# Patient Record
Sex: MALE | Race: WHITE | NOT HISPANIC OR LATINO | Employment: OTHER | RURAL
[De-identification: names, ages, dates, MRNs, and addresses within clinical notes are randomized per-mention and may not be internally consistent; named-entity substitution may affect disease eponyms.]

---

## 2021-09-28 ENCOUNTER — OFFICE VISIT (OUTPATIENT)
Dept: PRIMARY CARE CLINIC | Facility: CLINIC | Age: 61
End: 2021-09-28
Payer: COMMERCIAL

## 2021-09-28 VITALS
OXYGEN SATURATION: 97 % | SYSTOLIC BLOOD PRESSURE: 120 MMHG | TEMPERATURE: 98 F | WEIGHT: 203 LBS | HEIGHT: 73 IN | HEART RATE: 97 BPM | BODY MASS INDEX: 26.9 KG/M2 | DIASTOLIC BLOOD PRESSURE: 84 MMHG | RESPIRATION RATE: 20 BRPM

## 2021-09-28 DIAGNOSIS — I25.10 CORONARY ARTERY DISEASE INVOLVING NATIVE CORONARY ARTERY OF NATIVE HEART, ANGINA PRESENCE UNSPECIFIED: ICD-10-CM

## 2021-09-28 DIAGNOSIS — E78.5 HYPERLIPIDEMIA, UNSPECIFIED HYPERLIPIDEMIA TYPE: ICD-10-CM

## 2021-09-28 DIAGNOSIS — F32.A DEPRESSION, UNSPECIFIED DEPRESSION TYPE: ICD-10-CM

## 2021-09-28 DIAGNOSIS — M19.90 ARTHRITIS: ICD-10-CM

## 2021-09-28 DIAGNOSIS — I42.0 DILATED CARDIOMYOPATHY: ICD-10-CM

## 2021-09-28 DIAGNOSIS — J32.9 SINUSITIS, UNSPECIFIED CHRONICITY, UNSPECIFIED LOCATION: ICD-10-CM

## 2021-09-28 DIAGNOSIS — H61.21 IMPACTED CERUMEN OF RIGHT EAR: Primary | ICD-10-CM

## 2021-09-28 LAB
ALBUMIN SERPL BCP-MCNC: 3.8 G/DL (ref 3.5–5)
ALBUMIN/GLOB SERPL: 0.9 {RATIO}
ALP SERPL-CCNC: 90 U/L (ref 45–115)
ALT SERPL W P-5'-P-CCNC: 33 U/L (ref 16–61)
ANION GAP SERPL CALCULATED.3IONS-SCNC: 6 MMOL/L (ref 7–16)
AST SERPL W P-5'-P-CCNC: 18 U/L (ref 15–37)
BILIRUB SERPL-MCNC: 0.4 MG/DL (ref 0–1.2)
BUN SERPL-MCNC: 17 MG/DL (ref 7–18)
BUN/CREAT SERPL: 18 (ref 6–20)
CALCIUM SERPL-MCNC: 9.9 MG/DL (ref 8.5–10.1)
CHLORIDE SERPL-SCNC: 107 MMOL/L (ref 98–107)
CHOLEST SERPL-MCNC: 240 MG/DL (ref 0–200)
CHOLEST/HDLC SERPL: 4.9 {RATIO}
CO2 SERPL-SCNC: 30 MMOL/L (ref 21–32)
CREAT SERPL-MCNC: 0.95 MG/DL (ref 0.7–1.3)
GLOBULIN SER-MCNC: 4.4 G/DL (ref 2–4)
GLUCOSE SERPL-MCNC: 146 MG/DL (ref 74–106)
HDLC SERPL-MCNC: 49 MG/DL (ref 40–60)
LDLC SERPL CALC-MCNC: 139 MG/DL
LDLC/HDLC SERPL: 2.8 {RATIO}
NONHDLC SERPL-MCNC: 191 MG/DL
POTASSIUM SERPL-SCNC: 3.8 MMOL/L (ref 3.5–5.1)
PROT SERPL-MCNC: 8.2 G/DL (ref 6.4–8.2)
SODIUM SERPL-SCNC: 139 MMOL/L (ref 136–145)
TRIGL SERPL-MCNC: 258 MG/DL (ref 35–150)
VLDLC SERPL-MCNC: 52 MG/DL

## 2021-09-28 PROCEDURE — 69209 REMOVE IMPACTED EAR WAX UNI: CPT | Mod: RT,,, | Performed by: FAMILY MEDICINE

## 2021-09-28 PROCEDURE — 99213 PR OFFICE/OUTPT VISIT, EST, LEVL III, 20-29 MIN: ICD-10-PCS | Mod: 25,,, | Performed by: FAMILY MEDICINE

## 2021-09-28 PROCEDURE — 96372 PR INJECTION,THERAP/PROPH/DIAG2ST, IM OR SUBCUT: ICD-10-PCS | Mod: ,,, | Performed by: FAMILY MEDICINE

## 2021-09-28 PROCEDURE — 69209 EAR CERUMEN REMOVAL: ICD-10-PCS | Mod: RT,,, | Performed by: FAMILY MEDICINE

## 2021-09-28 PROCEDURE — 85025 CBC WITH DIFFERENTIAL: ICD-10-PCS | Mod: ,,, | Performed by: CLINICAL MEDICAL LABORATORY

## 2021-09-28 PROCEDURE — 80061 LIPID PANEL: CPT | Mod: ,,, | Performed by: CLINICAL MEDICAL LABORATORY

## 2021-09-28 PROCEDURE — 99213 OFFICE O/P EST LOW 20 MIN: CPT | Mod: 25,,, | Performed by: FAMILY MEDICINE

## 2021-09-28 PROCEDURE — 85025 COMPLETE CBC W/AUTO DIFF WBC: CPT | Mod: ,,, | Performed by: CLINICAL MEDICAL LABORATORY

## 2021-09-28 PROCEDURE — 80061 LIPID PANEL: ICD-10-PCS | Mod: ,,, | Performed by: CLINICAL MEDICAL LABORATORY

## 2021-09-28 PROCEDURE — 80053 COMPREHEN METABOLIC PANEL: CPT | Mod: ,,, | Performed by: CLINICAL MEDICAL LABORATORY

## 2021-09-28 PROCEDURE — 80053 COMPREHENSIVE METABOLIC PANEL: ICD-10-PCS | Mod: ,,, | Performed by: CLINICAL MEDICAL LABORATORY

## 2021-09-28 PROCEDURE — 96372 THER/PROPH/DIAG INJ SC/IM: CPT | Mod: ,,, | Performed by: FAMILY MEDICINE

## 2021-09-28 RX ORDER — ALBUTEROL SULFATE 90 UG/1
2 AEROSOL, METERED RESPIRATORY (INHALATION) EVERY 6 HOURS PRN
COMMUNITY
End: 2021-09-28 | Stop reason: SDUPTHER

## 2021-09-28 RX ORDER — BETAMETHASONE SODIUM PHOSPHATE AND BETAMETHASONE ACETATE 3; 3 MG/ML; MG/ML
6 INJECTION, SUSPENSION INTRA-ARTICULAR; INTRALESIONAL; INTRAMUSCULAR; SOFT TISSUE
Status: COMPLETED | OUTPATIENT
Start: 2021-09-28 | End: 2021-09-28

## 2021-09-28 RX ORDER — CEFTRIAXONE 1 G/1
1 INJECTION, POWDER, FOR SOLUTION INTRAMUSCULAR; INTRAVENOUS
Status: COMPLETED | OUTPATIENT
Start: 2021-09-28 | End: 2021-09-28

## 2021-09-28 RX ORDER — ALBUTEROL SULFATE 90 UG/1
2 AEROSOL, METERED RESPIRATORY (INHALATION) EVERY 6 HOURS PRN
Qty: 18 G | Refills: 5 | Status: SHIPPED | OUTPATIENT
Start: 2021-09-28 | End: 2021-12-27

## 2021-09-28 RX ORDER — NABUMETONE 750 MG/1
750 TABLET, FILM COATED ORAL 2 TIMES DAILY
COMMUNITY
End: 2021-09-28 | Stop reason: SDUPTHER

## 2021-09-28 RX ORDER — SERTRALINE HYDROCHLORIDE 100 MG/1
100 TABLET, FILM COATED ORAL DAILY
COMMUNITY
Start: 2021-08-04 | End: 2024-01-03 | Stop reason: SDUPTHER

## 2021-09-28 RX ORDER — CEPHALEXIN 500 MG/1
500 CAPSULE ORAL EVERY 8 HOURS
Qty: 30 CAPSULE | Refills: 0 | Status: SHIPPED | OUTPATIENT
Start: 2021-09-28

## 2021-09-28 RX ORDER — ROSUVASTATIN CALCIUM 5 MG/1
TABLET, COATED ORAL
COMMUNITY
Start: 2021-06-28 | End: 2021-09-28 | Stop reason: SDUPTHER

## 2021-09-28 RX ORDER — ACETAMINOPHEN AND CODEINE PHOSPHATE 300; 30 MG/1; MG/1
1 TABLET ORAL
Status: ON HOLD | COMMUNITY
Start: 2021-06-30 | End: 2023-12-15 | Stop reason: HOSPADM

## 2021-09-28 RX ORDER — DULOXETIN HYDROCHLORIDE 30 MG/1
30 CAPSULE, DELAYED RELEASE ORAL DAILY
COMMUNITY
Start: 2021-06-28 | End: 2021-09-28 | Stop reason: SDUPTHER

## 2021-09-28 RX ORDER — ROSUVASTATIN CALCIUM 5 MG/1
5 TABLET, COATED ORAL NIGHTLY
Qty: 30 TABLET | Refills: 5 | Status: SHIPPED | OUTPATIENT
Start: 2021-09-28 | End: 2024-01-03

## 2021-09-28 RX ORDER — DULOXETIN HYDROCHLORIDE 30 MG/1
30 CAPSULE, DELAYED RELEASE ORAL DAILY
Qty: 30 CAPSULE | Refills: 5 | Status: SHIPPED | OUTPATIENT
Start: 2021-09-28 | End: 2022-04-27

## 2021-09-28 RX ORDER — OMEPRAZOLE 20 MG/1
20 CAPSULE, DELAYED RELEASE ORAL DAILY
Qty: 30 CAPSULE | Refills: 5 | Status: SHIPPED | OUTPATIENT
Start: 2021-09-28 | End: 2022-04-27

## 2021-09-28 RX ORDER — NABUMETONE 750 MG/1
750 TABLET, FILM COATED ORAL 2 TIMES DAILY
Qty: 60 TABLET | Refills: 5 | Status: SHIPPED | OUTPATIENT
Start: 2021-09-28 | End: 2021-10-28

## 2021-09-28 RX ORDER — DILTIAZEM HYDROCHLORIDE 120 MG/1
120 TABLET, FILM COATED ORAL DAILY
COMMUNITY
Start: 2021-07-27

## 2021-09-28 RX ORDER — CETIRIZINE HYDROCHLORIDE 10 MG/1
10 TABLET ORAL DAILY
Qty: 30 TABLET | Refills: 2 | Status: SHIPPED | OUTPATIENT
Start: 2021-09-28 | End: 2022-09-28

## 2021-09-28 RX ORDER — OMEPRAZOLE 20 MG/1
CAPSULE, DELAYED RELEASE ORAL
COMMUNITY
Start: 2021-06-28 | End: 2021-09-28 | Stop reason: SDUPTHER

## 2021-09-28 RX ADMIN — CEFTRIAXONE 1 G: 1 INJECTION, POWDER, FOR SOLUTION INTRAMUSCULAR; INTRAVENOUS at 03:09

## 2021-09-28 RX ADMIN — BETAMETHASONE SODIUM PHOSPHATE AND BETAMETHASONE ACETATE 6 MG: 3; 3 INJECTION, SUSPENSION INTRA-ARTICULAR; INTRALESIONAL; INTRAMUSCULAR; SOFT TISSUE at 03:09

## 2021-09-29 ENCOUNTER — TELEPHONE (OUTPATIENT)
Dept: PRIMARY CARE CLINIC | Facility: CLINIC | Age: 61
End: 2021-09-29

## 2021-09-29 LAB
BASOPHILS # BLD AUTO: 0.04 K/UL (ref 0–0.2)
BASOPHILS NFR BLD AUTO: 0.4 % (ref 0–1)
DIFFERENTIAL METHOD BLD: ABNORMAL
EOSINOPHIL # BLD AUTO: 0.39 K/UL (ref 0–0.5)
EOSINOPHIL NFR BLD AUTO: 3.6 % (ref 1–4)
ERYTHROCYTE [DISTWIDTH] IN BLOOD BY AUTOMATED COUNT: 14.3 % (ref 11.5–14.5)
HCT VFR BLD AUTO: 43.3 % (ref 40–54)
HGB BLD-MCNC: 13.9 G/DL (ref 13.5–18)
IMM GRANULOCYTES # BLD AUTO: 0.02 K/UL (ref 0–0.04)
IMM GRANULOCYTES NFR BLD: 0.2 % (ref 0–0.4)
LYMPHOCYTES # BLD AUTO: 1.79 K/UL (ref 1–4.8)
LYMPHOCYTES NFR BLD AUTO: 16.4 % (ref 27–41)
MCH RBC QN AUTO: 28.1 PG (ref 27–31)
MCHC RBC AUTO-ENTMCNC: 32.1 G/DL (ref 32–36)
MCV RBC AUTO: 87.7 FL (ref 80–96)
MONOCYTES # BLD AUTO: 0.71 K/UL (ref 0–0.8)
MONOCYTES NFR BLD AUTO: 6.5 % (ref 2–6)
MPC BLD CALC-MCNC: 10.3 FL (ref 9.4–12.4)
NEUTROPHILS # BLD AUTO: 7.96 K/UL (ref 1.8–7.7)
NEUTROPHILS NFR BLD AUTO: 72.9 % (ref 53–65)
NRBC # BLD AUTO: 0 X10E3/UL
NRBC, AUTO (.00): 0 %
PLATELET # BLD AUTO: 374 K/UL (ref 150–400)
RBC # BLD AUTO: 4.94 M/UL (ref 4.6–6.2)
WBC # BLD AUTO: 10.91 K/UL (ref 4.5–11)

## 2022-03-21 ENCOUNTER — OFFICE VISIT (OUTPATIENT)
Dept: PRIMARY CARE CLINIC | Facility: CLINIC | Age: 62
End: 2022-03-21
Payer: COMMERCIAL

## 2022-03-21 DIAGNOSIS — M25.512 SHOULDER PAIN, LEFT: ICD-10-CM

## 2022-03-21 DIAGNOSIS — M19.90 ARTHRITIS: Primary | ICD-10-CM

## 2022-03-21 DIAGNOSIS — R10.9 ABDOMINAL PAIN: ICD-10-CM

## 2022-03-21 DIAGNOSIS — I25.118 CORONARY ARTERY DISEASE OF NATIVE ARTERY OF NATIVE HEART WITH STABLE ANGINA PECTORIS: ICD-10-CM

## 2022-03-21 DIAGNOSIS — K59.00 CONSTIPATION, UNSPECIFIED CONSTIPATION TYPE: ICD-10-CM

## 2022-03-21 PROCEDURE — 99213 PR OFFICE/OUTPT VISIT, EST, LEVL III, 20-29 MIN: ICD-10-PCS | Mod: 25,,, | Performed by: FAMILY MEDICINE

## 2022-03-21 PROCEDURE — 3008F BODY MASS INDEX DOCD: CPT | Mod: CPTII,,, | Performed by: FAMILY MEDICINE

## 2022-03-21 PROCEDURE — 3080F PR MOST RECENT DIASTOLIC BLOOD PRESSURE >= 90 MM HG: ICD-10-PCS | Mod: CPTII,,, | Performed by: FAMILY MEDICINE

## 2022-03-21 PROCEDURE — 3008F PR BODY MASS INDEX (BMI) DOCUMENTED: ICD-10-PCS | Mod: CPTII,,, | Performed by: FAMILY MEDICINE

## 2022-03-21 PROCEDURE — 3077F PR MOST RECENT SYSTOLIC BLOOD PRESSURE >= 140 MM HG: ICD-10-PCS | Mod: CPTII,,, | Performed by: FAMILY MEDICINE

## 2022-03-21 PROCEDURE — 3080F DIAST BP >= 90 MM HG: CPT | Mod: CPTII,,, | Performed by: FAMILY MEDICINE

## 2022-03-21 PROCEDURE — 3077F SYST BP >= 140 MM HG: CPT | Mod: CPTII,,, | Performed by: FAMILY MEDICINE

## 2022-03-21 PROCEDURE — 96372 PR INJECTION,THERAP/PROPH/DIAG2ST, IM OR SUBCUT: ICD-10-PCS | Mod: ,,, | Performed by: FAMILY MEDICINE

## 2022-03-21 PROCEDURE — 96372 THER/PROPH/DIAG INJ SC/IM: CPT | Mod: ,,, | Performed by: FAMILY MEDICINE

## 2022-03-21 PROCEDURE — 99213 OFFICE O/P EST LOW 20 MIN: CPT | Mod: 25,,, | Performed by: FAMILY MEDICINE

## 2022-03-21 RX ADMIN — METHYLPREDNISOLONE ACETATE 80 MG: 80 INJECTION, SUSPENSION INTRA-ARTICULAR; INTRALESIONAL; INTRAMUSCULAR; SOFT TISSUE at 03:03

## 2022-03-21 RX ADMIN — KETOROLAC TROMETHAMINE 60 MG: 30 INJECTION, SOLUTION INTRAMUSCULAR; INTRAVENOUS at 03:03

## 2022-03-21 RX ADMIN — DEXAMETHASONE SODIUM PHOSPHATE 4 MG: 4 INJECTION, SOLUTION INTRA-ARTICULAR; INTRALESIONAL; INTRAMUSCULAR; INTRAVENOUS; SOFT TISSUE at 03:03

## 2022-03-22 DIAGNOSIS — R10.9 ABDOMINAL PAIN, UNSPECIFIED ABDOMINAL LOCATION: ICD-10-CM

## 2022-03-22 DIAGNOSIS — M25.512 ACUTE PAIN OF LEFT SHOULDER: Primary | ICD-10-CM

## 2022-03-23 VITALS
SYSTOLIC BLOOD PRESSURE: 140 MMHG | HEIGHT: 73 IN | OXYGEN SATURATION: 96 % | HEART RATE: 99 BPM | DIASTOLIC BLOOD PRESSURE: 100 MMHG | BODY MASS INDEX: 27.3 KG/M2 | TEMPERATURE: 98 F | WEIGHT: 206 LBS

## 2022-03-23 PROBLEM — M19.049 LOCALIZED, PRIMARY OSTEOARTHRITIS OF HAND: Status: ACTIVE | Noted: 2022-03-23

## 2022-03-23 PROBLEM — Z95.5 PRESENCE OF CORONARY ANGIOPLASTY IMPLANT AND GRAFT: Status: ACTIVE | Noted: 2022-03-23

## 2022-03-23 PROBLEM — F41.1 GENERALIZED ANXIETY DISORDER: Status: ACTIVE | Noted: 2022-03-23

## 2022-03-23 PROBLEM — G89.29 CHRONIC PAIN: Status: ACTIVE | Noted: 2022-03-23

## 2022-03-23 PROBLEM — J34.0 CELLULITIS OF NOSE, EXTERNAL: Status: ACTIVE | Noted: 2022-03-23

## 2022-03-23 PROBLEM — T50.905A ADVERSE EFFECT OF DRUG: Status: ACTIVE | Noted: 2022-03-23

## 2022-03-23 PROBLEM — Z78.9 STATIN NOT TOLERATED: Status: ACTIVE | Noted: 2022-03-23

## 2022-03-23 PROBLEM — K59.00 CONSTIPATION: Status: ACTIVE | Noted: 2022-03-23

## 2022-03-23 PROBLEM — S06.9XAA TRAUMATIC BRAIN INJURY: Status: ACTIVE | Noted: 2022-03-23

## 2022-03-23 PROBLEM — R06.00 DYSPNEA: Status: ACTIVE | Noted: 2022-03-23

## 2022-03-23 PROBLEM — R09.89 LABILE HYPERTENSION DUE TO CLINICAL ENVIRONMENT: Status: ACTIVE | Noted: 2022-03-23

## 2022-03-23 PROBLEM — I10 ESSENTIAL HYPERTENSION: Status: ACTIVE | Noted: 2022-03-23

## 2022-03-23 PROBLEM — G44.009 CLUSTER HEADACHE SYNDROME: Status: ACTIVE | Noted: 2022-03-23

## 2022-03-23 PROBLEM — M79.7 FIBROMYALGIA: Status: ACTIVE | Noted: 2022-03-23

## 2022-03-23 PROBLEM — I25.118 CORONARY ARTERY DISEASE OF NATIVE ARTERY OF NATIVE HEART WITH STABLE ANGINA PECTORIS: Status: ACTIVE | Noted: 2021-09-28

## 2022-03-23 PROBLEM — M46.46 DISCITIS OF LUMBAR REGION: Status: ACTIVE | Noted: 2022-03-23

## 2022-03-23 RX ORDER — DEXAMETHASONE SODIUM PHOSPHATE 4 MG/ML
4 INJECTION, SOLUTION INTRA-ARTICULAR; INTRALESIONAL; INTRAMUSCULAR; INTRAVENOUS; SOFT TISSUE
Status: COMPLETED | OUTPATIENT
Start: 2022-03-23 | End: 2022-03-21

## 2022-03-23 RX ORDER — KETOROLAC TROMETHAMINE 30 MG/ML
60 INJECTION, SOLUTION INTRAMUSCULAR; INTRAVENOUS
Status: COMPLETED | OUTPATIENT
Start: 2022-03-23 | End: 2022-03-21

## 2022-03-23 RX ORDER — METHYLPREDNISOLONE ACETATE 80 MG/ML
80 INJECTION, SUSPENSION INTRA-ARTICULAR; INTRALESIONAL; INTRAMUSCULAR; SOFT TISSUE
Status: COMPLETED | OUTPATIENT
Start: 2022-03-23 | End: 2022-03-21

## 2022-03-23 NOTE — PROGRESS NOTES
Subjective:       Patient ID: Earnest Gonzalez is a 62 y.o. male.    Chief Complaint: Shoulder Injury (Left shoulder pain due to fall 2 days ago.), Abdominal Pain (Worse on left side.), and Sinus Problem      Pt. With arthritic pain and constipation.    Review of Systems   Constitutional: Negative for fatigue and fever.   HENT: Negative for dental problem.    Eyes: Negative for discharge.   Respiratory: Negative for cough, choking, chest tightness and shortness of breath.    Cardiovascular: Negative for chest pain and leg swelling.   Gastrointestinal: Positive for constipation. Negative for diarrhea, nausea and vomiting.   Genitourinary: Negative for discharge and flank pain.   Musculoskeletal: Positive for arthralgias and back pain. Negative for myalgias.   Allergic/Immunologic: Negative for environmental allergies.   Neurological: Negative for headaches and memory loss.   Psychiatric/Behavioral: Negative for behavioral problems and hallucinations.         Objective:      Physical Exam  Vitals and nursing note reviewed.   Constitutional:       Appearance: Normal appearance. He is normal weight.   HENT:      Head: Normocephalic and atraumatic.      Right Ear: Tympanic membrane normal.      Left Ear: Tympanic membrane normal.      Nose: Nose normal.      Mouth/Throat:      Mouth: Mucous membranes are moist.   Eyes:      Extraocular Movements: Extraocular movements intact.      Conjunctiva/sclera: Conjunctivae normal.      Pupils: Pupils are equal, round, and reactive to light.   Cardiovascular:      Rate and Rhythm: Normal rate and regular rhythm.      Pulses: Normal pulses.   Pulmonary:      Effort: Pulmonary effort is normal.      Breath sounds: Normal breath sounds.   Abdominal:      General: Abdomen is flat. Bowel sounds are normal.      Palpations: Abdomen is soft.   Musculoskeletal:         General: Normal range of motion.      Cervical back: Normal range of motion and neck supple.      Comments: Multiple site  arthritis; bilateral shoulder pain   Skin:     General: Skin is warm and dry.   Neurological:      General: No focal deficit present.      Mental Status: He is alert and oriented to person, place, and time.   Psychiatric:         Mood and Affect: Mood normal.         Assessment:       Abdominal pain  -     X-Ray KUB; Future; Expected date: 03/22/2022    Shoulder pain, left  -     X-Ray Shoulder 2 or More Views Left; Future; Expected date: 03/22/2022

## 2023-01-30 ENCOUNTER — PATIENT OUTREACH (OUTPATIENT)
Dept: PRIMARY CARE CLINIC | Facility: CLINIC | Age: 63
End: 2023-01-30
Payer: COMMERCIAL

## 2023-01-30 LAB — CRC RECOMMENDATION EXT: NORMAL

## 2023-02-02 ENCOUNTER — PATIENT OUTREACH (OUTPATIENT)
Dept: PRIMARY CARE CLINIC | Facility: CLINIC | Age: 63
End: 2023-02-02
Payer: COMMERCIAL

## 2023-02-02 LAB — CRC RECOMMENDATION EXT: NORMAL

## 2023-07-31 ENCOUNTER — HOSPITAL ENCOUNTER (OUTPATIENT)
Dept: RADIOLOGY | Facility: HOSPITAL | Age: 63
Discharge: HOME | End: 2023-07-31
Attending: NURSE PRACTITIONER
Payer: COMMERCIAL

## 2023-07-31 DIAGNOSIS — M25.561 ACUTE PAIN OF RIGHT KNEE: ICD-10-CM

## 2023-07-31 PROCEDURE — 73562 X-RAY EXAM OF KNEE 3: CPT | Mod: TC,RT

## 2023-09-05 ENCOUNTER — HOSPITAL ENCOUNTER (OUTPATIENT)
Dept: RADIOLOGY | Facility: HOSPITAL | Age: 63
Discharge: HOME | End: 2023-09-05
Attending: NURSE PRACTITIONER
Payer: COMMERCIAL

## 2023-09-05 ENCOUNTER — OFFICE VISIT (OUTPATIENT)
Dept: ORTHOPEDICS | Facility: CLINIC | Age: 63
End: 2023-09-05
Payer: COMMERCIAL

## 2023-09-05 DIAGNOSIS — S83.271A COMPLEX TEAR OF LATERAL MENISCUS OF RIGHT KNEE AS CURRENT INJURY, INITIAL ENCOUNTER: Primary | ICD-10-CM

## 2023-09-05 DIAGNOSIS — S83.241A ACUTE MEDIAL MENISCUS TEAR OF RIGHT KNEE, INITIAL ENCOUNTER: ICD-10-CM

## 2023-09-05 DIAGNOSIS — M25.561 RIGHT KNEE PAIN, UNSPECIFIED CHRONICITY: ICD-10-CM

## 2023-09-05 PROCEDURE — 1160F PR REVIEW ALL MEDS BY PRESCRIBER/CLIN PHARMACIST DOCUMENTED: ICD-10-PCS | Mod: ,,, | Performed by: ORTHOPAEDIC SURGERY

## 2023-09-05 PROCEDURE — 20610 DRAIN/INJ JOINT/BURSA W/O US: CPT | Mod: PBBFAC | Performed by: ORTHOPAEDIC SURGERY

## 2023-09-05 PROCEDURE — 99212 OFFICE O/P EST SF 10 MIN: CPT | Mod: PBBFAC,25 | Performed by: ORTHOPAEDIC SURGERY

## 2023-09-05 PROCEDURE — 73721 MRI JNT OF LWR EXTRE W/O DYE: CPT | Mod: TC,RT

## 2023-09-05 PROCEDURE — 4010F PR ACE/ARB THEARPY RXD/TAKEN: ICD-10-PCS | Mod: ,,, | Performed by: ORTHOPAEDIC SURGERY

## 2023-09-05 PROCEDURE — 1159F MED LIST DOCD IN RCRD: CPT | Mod: ,,, | Performed by: ORTHOPAEDIC SURGERY

## 2023-09-05 PROCEDURE — 4010F ACE/ARB THERAPY RXD/TAKEN: CPT | Mod: ,,, | Performed by: ORTHOPAEDIC SURGERY

## 2023-09-05 PROCEDURE — 1160F RVW MEDS BY RX/DR IN RCRD: CPT | Mod: ,,, | Performed by: ORTHOPAEDIC SURGERY

## 2023-09-05 PROCEDURE — 1159F PR MEDICATION LIST DOCUMENTED IN MEDICAL RECORD: ICD-10-PCS | Mod: ,,, | Performed by: ORTHOPAEDIC SURGERY

## 2023-09-05 PROCEDURE — 99999PBSHW PR PBB SHADOW TECHNICAL ONLY FILED TO HB: ICD-10-PCS | Mod: PBBFAC,,,

## 2023-09-05 PROCEDURE — 99204 PR OFFICE/OUTPT VISIT, NEW, LEVL IV, 45-59 MIN: ICD-10-PCS | Mod: S$PBB,25,, | Performed by: ORTHOPAEDIC SURGERY

## 2023-09-05 PROCEDURE — 20610 LARGE JOINT ASPIRATION/INJECTION: R KNEE: ICD-10-PCS | Mod: S$PBB,RT,, | Performed by: ORTHOPAEDIC SURGERY

## 2023-09-05 PROCEDURE — 99999PBSHW PR PBB SHADOW TECHNICAL ONLY FILED TO HB: Mod: PBBFAC,,,

## 2023-09-05 PROCEDURE — 99204 OFFICE O/P NEW MOD 45 MIN: CPT | Mod: S$PBB,25,, | Performed by: ORTHOPAEDIC SURGERY

## 2023-09-05 RX ORDER — NAPROXEN 500 MG/1
500 TABLET ORAL 2 TIMES DAILY WITH MEALS
Qty: 60 TABLET | Refills: 3 | Status: SHIPPED | OUTPATIENT
Start: 2023-09-05

## 2023-09-05 RX ADMIN — BUPIVACAINE HYDROCHLORIDE 3 ML: 5 INJECTION, SOLUTION PERINEURAL at 01:09

## 2023-09-05 RX ADMIN — TRIAMCINOLONE ACETONIDE 40 MG: 400 INJECTION, SUSPENSION INTRA-ARTICULAR; INTRAMUSCULAR at 01:09

## 2023-09-05 NOTE — PROGRESS NOTES
ASSESSMENT:      ICD-10-CM ICD-9-CM   1. Complex tear of lateral meniscus of right knee as current injury, initial encounter  S83.271A 836.1   2. Right knee pain, unspecified chronicity  M25.561 719.46       PLAN:     Findings and treatment options were discussed with the patient regarding the diagnosis.   All questions were answered regarding Earnest Gonzalez 's painful knee.      Natural history and expected course discussed. Questions answered. Educational materials distributed. Rest, ice, compression, and elevation (RICE) therapy. Arthrocentesis. See procedure note.  We discussed treatment options as released his right knee.  He does have meniscus tears present but this should respond favorably to injections and an exercise program.  We did discuss early surgical intervention as well but because he is not having mechanical symptoms such as clicking locking popping catching I think that injection is warranted conservative cares warranted at this point.  See back in the next 4-6 weeks for recheck.    There are no Patient Instructions on file for this visit.    IMAGING:   MRI Knee Without Contrast Right    Result Date: 9/5/2023  EXAMINATION: MRI KNEE WITHOUT CONTRAST RIGHT CLINICAL HISTORY: Other tear of medial meniscus, current injury, right knee, initial encounterKnee pain, chronic, negative xray (Age >= 5y); COMPARISON: Right knee x-ray July 31, 2023 TECHNIQUE: Magnetic resonance imaging of the right knee was performed without the use of intravenous contrast. FINDINGS: Medial meniscus: There is degenerative signal throughout much of the body and posterior horn of the medial meniscus with signal continuation to 2 the femoral and tibial articular surfaces, greatest at the junction of the body and posterior horn, suspicious for tear.  There is some irregularity along the dorsal aspect of the junction of the body and posterior horn of the medial meniscus suspicious for vertical component of tear. Lateral  meniscus: There is degenerative signal of the lateral meniscus with continuation of abnormal signal to the tibial articular surface at the junction of the posterior horn and root suspicious for tear. Anterior cruciate ligament: Moderate grade tearing versus mucoid degeneration of the proximal ACL.  Correlate with anterior drawer. Posterior cruciate ligament: The posterior cruciate ligament is grossly intact. Medial collateral ligament: The medial collateral ligament is grossly intact. Lateral collateral ligament: The lateral collateral ligament is grossly  intact. Popliteus tendon: The popliteus tendon is grossly  intact. Extensor mechanism: The patellar tendon and the visualized portion of the quadriceps tendon are grossly intact. Osseous structures and cartilage: Mild to moderate cartilage loss within the medial and lateral compartments.  Moderate to high-grade cartilage loss involving the inferior aspect of the medial patellar facet articular surface with underlying subchondral cystic change/edema.  Mild tricompartmental marginal osteophyte formation.  Suspect a few subcentimeter loose bodies within the dorsal knee joint along the dorsal aspect of the mid PCL. Joint fluid: Small knee joint effusion.  Baker's cyst present measuring up to 2.3 cm in axial dimension and up to 6 cm in craniocaudad dimension.     Suspicion for medial and lateral meniscal tears as detailed. Moderate grade tearing versus mucoid degeneration of the proximal ACL. Correlate with anterior drawer. Mild to moderate cartilage loss within the medial and lateral compartments. Moderate to high-grade cartilage loss involving the inferior aspect of the medial patellar facet articular surface with underlying subchondral cystic change/edema. Mild tricompartmental marginal osteophyte formation. Suspect a few subcentimeter loose bodies within the dorsal knee joint along the dorsal aspect of the mid PCL. Small knee joint effusion. Baker's cyst present  measuring up to 2.3 cm in axial dimension and up to 6 cm in craniocaudad dimension. Point of Service: [facility] Electronically signed by: Ernesto Francisco Date:    09/05/2023 Time:    12:08         CC: Knee pain    63 y.o. Male who presents as a new patient to me for evaluation of  right knee pain.    Occupation: retired  Pain has been present for several weeks now.  Injury description He states he has had several injuries including twisting it while working in the garden, stepping in a hole and jumping off a boat.   Mechanical symptoms, such as clicking, locking, and popping are not present.    Swelling and effusions  are not present.   The patient does  describe symptoms of knee instability, such as the knee buckling and the knee giving way.   Symptoms are worsened with activity.  Better with rest. Treatment thus far has included rest, activity modifications, and oral medications.    he  has not had formal physical therapy.  he has not had prior injections injections into the knee.   he has had previous advanced imaging such as MRI.     Here today to discuss diagnosis and treatment options.          REVIEW OF SYSTEMS:   Constitution: Negative. Negative for chills, fever and night sweats.    Hematologic/Lymphatic: Negative for bleeding problem. Does not bruise/bleed easily.   Skin: Negative for dry skin, itching and rash.   Musculoskeletal: Negative for falls. Positive for knee pain and muscle weakness.     All other review of symptoms were reviewed and found to be noncontributory.     PAST MEDICAL HISTORY:   Past Medical History:   Diagnosis Date    GERD (gastroesophageal reflux disease)     H/O colonoscopy with polypectomy 01/30/2023    Hyperlipidemia     Hypertension        PAST SURGICAL HISTORY:   History reviewed. No pertinent surgical history.    FAMILY HISTORY:   History reviewed. No pertinent family history.    SOCIAL HISTORY:   Social History     Socioeconomic History    Marital status:     Tobacco Use    Smoking status: Never    Smokeless tobacco: Never   Substance and Sexual Activity    Alcohol use: Not Currently    Sexual activity: Yes       MEDICATIONS:     Current Outpatient Medications:     acetaminophen-codeine 300-30mg (TYLENOL #3) 300-30 mg Tab, Take 1 tablet by mouth every 4 to 6 hours as needed., Disp: , Rfl:     albuterol (VENTOLIN HFA) 90 mcg/actuation inhaler, Inhale 2 puffs into the lungs every 6 (six) hours as needed for Wheezing. Rescue, Disp: 18 g, Rfl: 5    cephALEXin (KEFLEX) 500 MG capsule, Take 1 capsule (500 mg total) by mouth every 8 (eight) hours., Disp: 30 capsule, Rfl: 0    cetirizine (ZYRTEC) 10 MG tablet, Take 1 tablet (10 mg total) by mouth once daily., Disp: 30 tablet, Rfl: 2    diltiaZEM (CARDIZEM) 120 MG tablet, Take 120 mg by mouth once daily., Disp: , Rfl:     DULoxetine (CYMBALTA) 30 MG capsule, TAKE 1 CAPSULE BY MOUTH EVERYDAY, Disp: 30 capsule, Rfl: 5    naproxen (NAPROSYN) 500 MG tablet, Take 1 tablet (500 mg total) by mouth 2 (two) times daily with meals., Disp: 60 tablet, Rfl: 3    omeprazole (PRILOSEC) 20 MG capsule, TAKE 1 CAPSULE BY MOUTH EVERYDAY 30 MINUTES PRIOR TO A MEAL, Disp: 30 capsule, Rfl: 2    rosuvastatin (CRESTOR) 5 MG tablet, Take 1 tablet (5 mg total) by mouth every evening., Disp: 30 tablet, Rfl: 5    sertraline (ZOLOFT) 100 MG tablet, Take 100 mg by mouth once daily., Disp: , Rfl:     ALLERGIES:   Review of patient's allergies indicates:  No Known Allergies     PHYSICAL EXAMINATION:    General    Constitutional: He is oriented to person, place, and time. He appears well-developed and well-nourished.   HENT:   Head: Normocephalic and atraumatic.   Nose: Nose normal.   Eyes: EOM are normal. Pupils are equal, round, and reactive to light.   Cardiovascular:  Normal rate and intact distal pulses.            Pulmonary/Chest: Effort normal. No respiratory distress. He exhibits no tenderness.   Abdominal: Soft. He exhibits no distension. There is no  abdominal tenderness.   Neurological: He is alert and oriented to person, place, and time. He has normal reflexes.   Psychiatric: He has a normal mood and affect. His behavior is normal. Judgment and thought content normal.           Right Knee Exam     Inspection   Swelling: present  Deformity: absent    Tenderness   The patient is tender to palpation of the medial retinaculum and medial joint line.    Crepitus   The patient has crepitus of the medial joint line and medial plica.    Range of Motion   Flexion:  abnormal     Tests   Meniscus   Dread:  Medial - positive   Ligament Examination   Lachman: normal (-1 to 2mm)   MCL - Valgus: normal (0 to 2mm)  LCL - Varus: normal  Dial Test at 30 degrees: normal (< 5 degrees)  Posterolateral Corner: unstable (>15 degrees difference)  Patella   Patellar apprehension: negative  Patellar Grind: positive    Other   Sensation: normal    Comments:  Pain with Thessaly Maneuver    Left Knee Exam   Left knee exam is normal.    Muscle Strength   Right Lower Extremity   Quadriceps:  5/5   Hamstrin/5     Reflexes     Right Side   Achilles:  2+  Quadriceps:  2+    Vascular Exam     Right Pulses  Dorsalis Pedis:      2+  Posterior Tibial:      2+            Orders Placed This Encounter   Procedures    Large Joint Aspiration/Injection: R knee     This order was created via procedure documentation       Procedures

## 2023-09-05 NOTE — PROCEDURES
Large Joint Aspiration/Injection: R knee    Date/Time: 9/5/2023 1:00 PM    Performed by: Son Pike MD  Authorized by: Son Pike MD    Consent Done?:  Yes (Verbal)  Indications:  Pain  Local anesthesia used?: No      Details:  Needle Size:  22 G  Approach:  Superior  Location:  Knee  Site:  R knee  Medications:  3 mL BUPivacaine 0.5 % (5 mg/mL); 40 mg triamcinolone acetonide 40 mg/mL  Patient tolerance:  Patient tolerated the procedure well with no immediate complications

## 2023-09-06 RX ORDER — BUPIVACAINE HYDROCHLORIDE 5 MG/ML
3 INJECTION, SOLUTION PERINEURAL
Status: DISCONTINUED | OUTPATIENT
Start: 2023-09-05 | End: 2023-09-06 | Stop reason: HOSPADM

## 2023-09-06 RX ORDER — TRIAMCINOLONE ACETONIDE 40 MG/ML
40 INJECTION, SUSPENSION INTRA-ARTICULAR; INTRAMUSCULAR
Status: DISCONTINUED | OUTPATIENT
Start: 2023-09-05 | End: 2023-09-06 | Stop reason: HOSPADM

## 2023-11-28 ENCOUNTER — OFFICE VISIT (OUTPATIENT)
Dept: ORTHOPEDICS | Facility: CLINIC | Age: 63
End: 2023-11-28
Payer: COMMERCIAL

## 2023-11-28 DIAGNOSIS — Z01.818 PREPROCEDURAL EXAMINATION: Primary | ICD-10-CM

## 2023-11-28 DIAGNOSIS — S83.271D COMPLEX TEAR OF LATERAL MENISCUS OF RIGHT KNEE AS CURRENT INJURY, SUBSEQUENT ENCOUNTER: Primary | ICD-10-CM

## 2023-11-28 PROCEDURE — 4010F PR ACE/ARB THEARPY RXD/TAKEN: ICD-10-PCS | Mod: ,,, | Performed by: ORTHOPAEDIC SURGERY

## 2023-11-28 PROCEDURE — 99213 OFFICE O/P EST LOW 20 MIN: CPT | Mod: PBBFAC | Performed by: ORTHOPAEDIC SURGERY

## 2023-11-28 PROCEDURE — 99214 OFFICE O/P EST MOD 30 MIN: CPT | Mod: S$PBB,,, | Performed by: ORTHOPAEDIC SURGERY

## 2023-11-28 PROCEDURE — 1159F PR MEDICATION LIST DOCUMENTED IN MEDICAL RECORD: ICD-10-PCS | Mod: ,,, | Performed by: ORTHOPAEDIC SURGERY

## 2023-11-28 PROCEDURE — 99214 PR OFFICE/OUTPT VISIT, EST, LEVL IV, 30-39 MIN: ICD-10-PCS | Mod: S$PBB,,, | Performed by: ORTHOPAEDIC SURGERY

## 2023-11-28 PROCEDURE — 4010F ACE/ARB THERAPY RXD/TAKEN: CPT | Mod: ,,, | Performed by: ORTHOPAEDIC SURGERY

## 2023-11-28 PROCEDURE — 1159F MED LIST DOCD IN RCRD: CPT | Mod: ,,, | Performed by: ORTHOPAEDIC SURGERY

## 2023-11-28 NOTE — H&P (VIEW-ONLY)
CC:  Knee pain    63 y.o. Male returns to clinic for a follow up visit regarding knee pain.       Patient received in his right knee on 9/5. He states that the injection lasted less than 2 weeks.   He is continuing to wear his knee brace daily.    He states that he also has left heel pain that started 2 days ago.          Past Medical History:   Diagnosis Date    GERD (gastroesophageal reflux disease)     H/O colonoscopy with polypectomy 01/30/2023    Hyperlipidemia     Hypertension      No past surgical history on file.      PHYSICAL EXAMINATION:  There were no vitals taken for this visit.  General    Constitutional: He is oriented to person, place, and time. He appears well-developed and well-nourished.   HENT:   Head: Normocephalic and atraumatic.   Nose: Nose normal.   Eyes: EOM are normal. Pupils are equal, round, and reactive to light.   Cardiovascular:  Normal rate and intact distal pulses.            Pulmonary/Chest: Effort normal. No respiratory distress. He exhibits no tenderness.   Abdominal: Soft. He exhibits no distension. There is no abdominal tenderness.   Neurological: He is alert and oriented to person, place, and time. He has normal reflexes.   Psychiatric: He has a normal mood and affect. His behavior is normal. Judgment and thought content normal.           Right Knee Exam     Inspection   Swelling: present  Deformity: absent    Tenderness   The patient is tender to palpation of the medial retinaculum and medial joint line.    Crepitus   The patient has crepitus of the medial joint line and medial plica.    Range of Motion   Flexion:  abnormal     Tests   Meniscus   Dread:  Medial - positive   Ligament Examination   Lachman: normal (-1 to 2mm)   MCL - Valgus: normal (0 to 2mm)  LCL - Varus: normal  Dial Test at 30 degrees: normal (< 5 degrees)  Posterolateral Corner: unstable (>15 degrees difference)  Patella   Patellar apprehension: negative  Patellar Grind: positive    Other   Sensation:  normal    Comments:  Pain with Thessaly Maneuver    Left Knee Exam   Left knee exam is normal.    Muscle Strength   Right Lower Extremity   Quadriceps:  5/5   Hamstrin/5     Reflexes     Right Side   Achilles:  2+  Quadriceps:  2+    Vascular Exam     Right Pulses  Dorsalis Pedis:      2+  Posterior Tibial:      2+            IMAGING:  No results found.   Once again reviewed MRI of his right knee which shows the presence of a medial meniscus tear as well as a lateral meniscus tear mucoid degeneration of his ACL with chondromalacia seen along the inferior pole of the patella  ASSESSMENT:      ICD-10-CM ICD-9-CM   1. Complex tear of lateral meniscus of right knee as current injury, subsequent encounter  S83.271D V58.89       PLAN:     -Findings and treatment options were discussed with the patient  -All questions answered  Natural history and expected course discussed. Questions answered.  Educational materials distributed.  Complaining of instability symptoms in the right knee.  Has failed an injection.  At this point is reasonable consider right knee arthroscopy with medial and lateral meniscectomy and chondroplasty    The conservative options including NSAIDs, activity modification, physical therapy, corticosteroid injection, and viscosupplimentation/ Platelet rich plasma injections were discussed. he is interested in surgical intervention at this time, as conservative measures up to this point have not fully relieved or resolved his symptoms.    The surgical process of knee arthroscopy was discussed in detail with the patient including a detailed discussion of the procedure itself (including visual model, x-ray review, and literature review). The typical perioperative and post-operative course was discussed and perioperative risks were discussed to the patient's satisfaction.  Risks and complications discussed included but were not limited to the risks of anesthetic complications, infection, bleeding, wound  healing complications, instability, limb length inequality, neurologic dysfunction including numbness,  DVT, pulmonary embolism, perioperative medical risks (cardiac, pulmonary, renal, neurologic), and death and the patient elects to proceed. We will initiate pre-operative medical evaluation and set a provisional date for surgical intervention according to the patient's schedule. 25 minutes was spent in direct consultation with the patient counselling him on the items listed above.      There are no Patient Instructions on file for this visit.      No orders of the defined types were placed in this encounter.        Procedures

## 2023-11-29 DIAGNOSIS — S83.271D COMPLEX TEAR OF LATERAL MENISCUS OF RIGHT KNEE AS CURRENT INJURY, SUBSEQUENT ENCOUNTER: Primary | ICD-10-CM

## 2023-11-29 DIAGNOSIS — S83.271A COMPLEX TEAR OF LATERAL MENISCUS OF RIGHT KNEE: ICD-10-CM

## 2023-11-29 RX ORDER — SODIUM CHLORIDE 9 MG/ML
INJECTION, SOLUTION INTRAVENOUS CONTINUOUS
Status: CANCELLED | OUTPATIENT
Start: 2023-11-29

## 2023-11-29 RX ORDER — MUPIROCIN 20 MG/G
OINTMENT TOPICAL
Status: CANCELLED | OUTPATIENT
Start: 2023-11-29

## 2023-11-30 ENCOUNTER — TELEPHONE (OUTPATIENT)
Dept: ORTHOPEDICS | Facility: CLINIC | Age: 63
End: 2023-11-30
Payer: COMMERCIAL

## 2023-11-30 NOTE — TELEPHONE ENCOUNTER
----- Message from Imelda Woodall sent at 11/30/2023  1:46 PM CST -----  Calling stating that he has 2 dates for surgery. 12/11 and 12/13. Can you call and confirm the date. The 13th would be better. Call back # [phone] or [phone]

## 2023-12-01 ENCOUNTER — TELEPHONE (OUTPATIENT)
Dept: ORTHOPEDICS | Facility: CLINIC | Age: 63
End: 2023-12-01
Payer: COMMERCIAL

## 2023-12-01 NOTE — TELEPHONE ENCOUNTER
----- Message from Lala Lundberg sent at 12/1/2023 10:52 AM CST -----  Regarding: SPEAK WITH SURJIT  WOULD LIKE TO SPEAK WITH SURJIT. CALL BACK NUMBER IS [phone] OR [phone].

## 2023-12-04 ENCOUNTER — TELEPHONE (OUTPATIENT)
Dept: ORTHOPEDICS | Facility: CLINIC | Age: 63
End: 2023-12-04
Payer: COMMERCIAL

## 2023-12-04 NOTE — TELEPHONE ENCOUNTER
----- Message from Judy Lou sent at 12/4/2023  1:34 PM CST -----  Regarding: Returning call  Patient wife Beckie is returning your call. Please call her @ [phone] or [phone]

## 2023-12-15 ENCOUNTER — ANESTHESIA (OUTPATIENT)
Dept: SURGERY | Facility: HOSPITAL | Age: 63
End: 2023-12-15
Payer: COMMERCIAL

## 2023-12-15 ENCOUNTER — HOSPITAL ENCOUNTER (OUTPATIENT)
Facility: HOSPITAL | Age: 63
Discharge: HOME | End: 2023-12-15
Attending: ORTHOPAEDIC SURGERY
Payer: COMMERCIAL

## 2023-12-15 ENCOUNTER — ANESTHESIA EVENT (OUTPATIENT)
Dept: SURGERY | Facility: HOSPITAL | Age: 63
End: 2023-12-15
Payer: COMMERCIAL

## 2023-12-15 VITALS
DIASTOLIC BLOOD PRESSURE: 71 MMHG | SYSTOLIC BLOOD PRESSURE: 103 MMHG | HEART RATE: 62 BPM | BODY MASS INDEX: 27.18 KG/M2 | TEMPERATURE: 98 F | RESPIRATION RATE: 15 BRPM | OXYGEN SATURATION: 95 % | WEIGHT: 206 LBS

## 2023-12-15 DIAGNOSIS — I10 HYPERTENSION: ICD-10-CM

## 2023-12-15 DIAGNOSIS — S83.271A COMPLEX TEAR OF LATERAL MENISCUS OF RIGHT KNEE: Primary | ICD-10-CM

## 2023-12-15 DIAGNOSIS — S83.271D COMPLEX TEAR OF LATERAL MENISCUS OF RIGHT KNEE AS CURRENT INJURY, SUBSEQUENT ENCOUNTER: ICD-10-CM

## 2023-12-15 PROCEDURE — 37000008 HC ANESTHESIA 1ST 15 MINUTES: Performed by: ORTHOPAEDIC SURGERY

## 2023-12-15 PROCEDURE — 63600175 PHARM REV CODE 636 W HCPCS: Performed by: NURSE ANESTHETIST, CERTIFIED REGISTERED

## 2023-12-15 PROCEDURE — 25000003 PHARM REV CODE 250: Performed by: NURSE ANESTHETIST, CERTIFIED REGISTERED

## 2023-12-15 PROCEDURE — 29879 ARTHRS KNE SRG ABRASJ ARTHRP: CPT | Mod: RT,,, | Performed by: ORTHOPAEDIC SURGERY

## 2023-12-15 PROCEDURE — 63600175 PHARM REV CODE 636 W HCPCS: Performed by: ORTHOPAEDIC SURGERY

## 2023-12-15 PROCEDURE — D9220A PRA ANESTHESIA: Mod: CRNA,,, | Performed by: NURSE ANESTHETIST, CERTIFIED REGISTERED

## 2023-12-15 PROCEDURE — 29880 PR KNEE SCOPE MED/LAT MENISCECTOMY: ICD-10-PCS | Mod: 51,RT,, | Performed by: ORTHOPAEDIC SURGERY

## 2023-12-15 PROCEDURE — D9220A PRA ANESTHESIA: ICD-10-PCS | Mod: ANES,,, | Performed by: ANESTHESIOLOGY

## 2023-12-15 PROCEDURE — 25000003 PHARM REV CODE 250: Performed by: ORTHOPAEDIC SURGERY

## 2023-12-15 PROCEDURE — 36000710: Performed by: ORTHOPAEDIC SURGERY

## 2023-12-15 PROCEDURE — 27201423 OPTIME MED/SURG SUP & DEVICES STERILE SUPPLY: Performed by: ORTHOPAEDIC SURGERY

## 2023-12-15 PROCEDURE — 71000033 HC RECOVERY, INTIAL HOUR: Performed by: ORTHOPAEDIC SURGERY

## 2023-12-15 PROCEDURE — 37000009 HC ANESTHESIA EA ADD 15 MINS: Performed by: ORTHOPAEDIC SURGERY

## 2023-12-15 PROCEDURE — 71000016 HC POSTOP RECOV ADDL HR: Performed by: ORTHOPAEDIC SURGERY

## 2023-12-15 PROCEDURE — 27000510 HC BLANKET BAIR HUGGER ANY SIZE: Performed by: NURSE ANESTHETIST, CERTIFIED REGISTERED

## 2023-12-15 PROCEDURE — 27000716 HC OXISENSOR PROBE, ANY SIZE: Performed by: NURSE ANESTHETIST, CERTIFIED REGISTERED

## 2023-12-15 PROCEDURE — 29880 ARTHRS KNE SRG MNISECTMY M&L: CPT | Mod: 51,RT,, | Performed by: ORTHOPAEDIC SURGERY

## 2023-12-15 PROCEDURE — D9220A PRA ANESTHESIA: ICD-10-PCS | Mod: CRNA,,, | Performed by: NURSE ANESTHETIST, CERTIFIED REGISTERED

## 2023-12-15 PROCEDURE — 97161 PT EVAL LOW COMPLEX 20 MIN: CPT

## 2023-12-15 PROCEDURE — 36000711: Performed by: ORTHOPAEDIC SURGERY

## 2023-12-15 PROCEDURE — 29879 PR KNEE SCOPE,ABRASN ARTHROPLASTY: ICD-10-PCS | Mod: RT,,, | Performed by: ORTHOPAEDIC SURGERY

## 2023-12-15 PROCEDURE — 71000015 HC POSTOP RECOV 1ST HR: Performed by: ORTHOPAEDIC SURGERY

## 2023-12-15 PROCEDURE — D9220A PRA ANESTHESIA: Mod: ANES,,, | Performed by: ANESTHESIOLOGY

## 2023-12-15 PROCEDURE — 27000177 HC AIRWAY, LARYNGEAL MASK: Performed by: NURSE ANESTHETIST, CERTIFIED REGISTERED

## 2023-12-15 PROCEDURE — 63600175 PHARM REV CODE 636 W HCPCS

## 2023-12-15 RX ORDER — ONDANSETRON 4 MG/1
8 TABLET, ORALLY DISINTEGRATING ORAL EVERY 8 HOURS PRN
Status: DISCONTINUED | OUTPATIENT
Start: 2023-12-15 | End: 2023-12-15 | Stop reason: HOSPADM

## 2023-12-15 RX ORDER — FENTANYL CITRATE 50 UG/ML
INJECTION, SOLUTION INTRAMUSCULAR; INTRAVENOUS
Status: DISCONTINUED | OUTPATIENT
Start: 2023-12-15 | End: 2023-12-15

## 2023-12-15 RX ORDER — BUPIVACAINE HYDROCHLORIDE 2.5 MG/ML
INJECTION, SOLUTION EPIDURAL; INFILTRATION; INTRACAUDAL
Status: DISCONTINUED | OUTPATIENT
Start: 2023-12-15 | End: 2023-12-15 | Stop reason: HOSPADM

## 2023-12-15 RX ORDER — ETOMIDATE 2 MG/ML
INJECTION INTRAVENOUS
Status: DISCONTINUED | OUTPATIENT
Start: 2023-12-15 | End: 2023-12-15

## 2023-12-15 RX ORDER — SODIUM CHLORIDE 9 MG/ML
INJECTION, SOLUTION INTRAVENOUS CONTINUOUS
Status: DISCONTINUED | OUTPATIENT
Start: 2023-12-15 | End: 2023-12-15 | Stop reason: HOSPADM

## 2023-12-15 RX ORDER — CEFAZOLIN SODIUM 1 G/3ML
INJECTION, POWDER, FOR SOLUTION INTRAMUSCULAR; INTRAVENOUS
Status: DISCONTINUED | OUTPATIENT
Start: 2023-12-15 | End: 2023-12-15

## 2023-12-15 RX ORDER — OXYCODONE HYDROCHLORIDE 5 MG/1
10 TABLET ORAL EVERY 4 HOURS PRN
Status: DISCONTINUED | OUTPATIENT
Start: 2023-12-15 | End: 2023-12-15 | Stop reason: HOSPADM

## 2023-12-15 RX ORDER — MEPERIDINE HYDROCHLORIDE 25 MG/ML
25 INJECTION INTRAMUSCULAR; INTRAVENOUS; SUBCUTANEOUS EVERY 10 MIN PRN
Status: DISCONTINUED | OUTPATIENT
Start: 2023-12-15 | End: 2023-12-15 | Stop reason: HOSPADM

## 2023-12-15 RX ORDER — MUPIROCIN 20 MG/G
OINTMENT TOPICAL
Status: DISCONTINUED | OUTPATIENT
Start: 2023-12-15 | End: 2023-12-15 | Stop reason: HOSPADM

## 2023-12-15 RX ORDER — OXYCODONE AND ACETAMINOPHEN 5; 325 MG/1; MG/1
1 TABLET ORAL EVERY 6 HOURS PRN
Qty: 30 TABLET | Refills: 0 | Status: SHIPPED | OUTPATIENT
Start: 2023-12-15

## 2023-12-15 RX ORDER — PHENYLEPHRINE HYDROCHLORIDE 10 MG/ML
INJECTION INTRAVENOUS
Status: DISCONTINUED | OUTPATIENT
Start: 2023-12-15 | End: 2023-12-15

## 2023-12-15 RX ORDER — ONDANSETRON 2 MG/ML
4 INJECTION INTRAMUSCULAR; INTRAVENOUS DAILY PRN
Status: DISCONTINUED | OUTPATIENT
Start: 2023-12-15 | End: 2023-12-15 | Stop reason: HOSPADM

## 2023-12-15 RX ORDER — MORPHINE SULFATE 10 MG/ML
4 INJECTION INTRAMUSCULAR; INTRAVENOUS; SUBCUTANEOUS EVERY 5 MIN PRN
Status: DISCONTINUED | OUTPATIENT
Start: 2023-12-15 | End: 2023-12-15 | Stop reason: HOSPADM

## 2023-12-15 RX ORDER — TRIAMCINOLONE ACETONIDE 40 MG/ML
INJECTION, SUSPENSION INTRA-ARTICULAR; INTRAMUSCULAR
Status: DISCONTINUED | OUTPATIENT
Start: 2023-12-15 | End: 2023-12-15 | Stop reason: HOSPADM

## 2023-12-15 RX ORDER — HYDROMORPHONE HYDROCHLORIDE 2 MG/ML
0.5 INJECTION, SOLUTION INTRAMUSCULAR; INTRAVENOUS; SUBCUTANEOUS EVERY 5 MIN PRN
Status: DISCONTINUED | OUTPATIENT
Start: 2023-12-15 | End: 2023-12-15 | Stop reason: HOSPADM

## 2023-12-15 RX ORDER — OXYCODONE AND ACETAMINOPHEN 5; 325 MG/1; MG/1
1 TABLET ORAL EVERY 4 HOURS PRN
Status: DISCONTINUED | OUTPATIENT
Start: 2023-12-15 | End: 2023-12-15 | Stop reason: HOSPADM

## 2023-12-15 RX ORDER — ONDANSETRON 2 MG/ML
INJECTION INTRAMUSCULAR; INTRAVENOUS
Status: DISCONTINUED | OUTPATIENT
Start: 2023-12-15 | End: 2023-12-15

## 2023-12-15 RX ORDER — LIDOCAINE HYDROCHLORIDE 20 MG/ML
INJECTION, SOLUTION EPIDURAL; INFILTRATION; INTRACAUDAL; PERINEURAL
Status: DISCONTINUED | OUTPATIENT
Start: 2023-12-15 | End: 2023-12-15

## 2023-12-15 RX ORDER — ONDANSETRON 4 MG/1
4 TABLET, ORALLY DISINTEGRATING ORAL EVERY 6 HOURS PRN
Qty: 30 TABLET | Refills: 0 | Status: SHIPPED | OUTPATIENT
Start: 2023-12-15

## 2023-12-15 RX ORDER — MIDAZOLAM HYDROCHLORIDE 1 MG/ML
INJECTION INTRAMUSCULAR; INTRAVENOUS
Status: DISCONTINUED | OUTPATIENT
Start: 2023-12-15 | End: 2023-12-15

## 2023-12-15 RX ORDER — EPINEPHRINE 1 MG/ML
INJECTION, SOLUTION, CONCENTRATE INTRAVENOUS
Status: DISCONTINUED | OUTPATIENT
Start: 2023-12-15 | End: 2023-12-15 | Stop reason: HOSPADM

## 2023-12-15 RX ORDER — DIPHENHYDRAMINE HYDROCHLORIDE 50 MG/ML
25 INJECTION INTRAMUSCULAR; INTRAVENOUS EVERY 6 HOURS PRN
Status: DISCONTINUED | OUTPATIENT
Start: 2023-12-15 | End: 2023-12-15 | Stop reason: HOSPADM

## 2023-12-15 RX ORDER — IPRATROPIUM BROMIDE AND ALBUTEROL SULFATE 2.5; .5 MG/3ML; MG/3ML
3 SOLUTION RESPIRATORY (INHALATION) ONCE
Status: DISCONTINUED | OUTPATIENT
Start: 2023-12-15 | End: 2023-12-15 | Stop reason: HOSPADM

## 2023-12-15 RX ADMIN — CEFAZOLIN 2 G: 1 INJECTION, POWDER, FOR SOLUTION INTRAMUSCULAR; INTRAVENOUS; PARENTERAL at 01:12

## 2023-12-15 RX ADMIN — LIDOCAINE HYDROCHLORIDE 100 MG: 20 INJECTION, SOLUTION INTRAVENOUS at 01:12

## 2023-12-15 RX ADMIN — FENTANYL CITRATE 100 MCG: 50 INJECTION INTRAMUSCULAR; INTRAVENOUS at 01:12

## 2023-12-15 RX ADMIN — MIDAZOLAM HYDROCHLORIDE 2 MG: 1 INJECTION, SOLUTION INTRAMUSCULAR; INTRAVENOUS at 01:12

## 2023-12-15 RX ADMIN — ETOMIDATE 12 MG: 2 INJECTION, SOLUTION INTRAVENOUS at 01:12

## 2023-12-15 RX ADMIN — PHENYLEPHRINE HYDROCHLORIDE 300 MCG: 10 INJECTION INTRAVENOUS at 02:12

## 2023-12-15 RX ADMIN — PHENYLEPHRINE HYDROCHLORIDE 200 MCG: 10 INJECTION INTRAVENOUS at 01:12

## 2023-12-15 RX ADMIN — ONDANSETRON 4 MG: 2 INJECTION INTRAMUSCULAR; INTRAVENOUS at 01:12

## 2023-12-15 RX ADMIN — SODIUM CHLORIDE: 9 INJECTION, SOLUTION INTRAVENOUS at 01:12

## 2023-12-15 NOTE — PLAN OF CARE
Problem: Physical Therapy  Goal: Physical Therapy Goal  Description: STG:  Pt will be independent in home exercise program   Pt will be independent in gait using crutches with NWB: right lower extremity     LTG:  Pt will return home to prior level of function with all mobility   Outcome: Ongoing, Progressing

## 2023-12-15 NOTE — PT/OT/SLP EVAL
Physical Therapy Evaluation and Discharge Note    Patient Name:  Earnest Gonzalez   MRN:  [id number]    Recommendations:     Discharge Recommendations: Low Intensity Therapy  Discharge Equipment Recommendations: crutches, axillary   Barriers to discharge: None    Assessment:     Earnest Gonzalez is a 63 y.o. male admitted with a medical diagnosis of Complex tear of lateral meniscus of right knee. Pt was educated on home exercise program and use of crutches. He was able to negotiate steps with assistance. Pt understands NWB status but may have difficulty adhering to this .  At this time, patient is functioning at their prior level of function and does not require further acute PT services.     Recent Surgery: Procedure(s) (LRB):  ARTHROSCOPY, KNEE, WITH CHONDROPLASTY (Right)  ARTHROSCOPY, KNEE, WITH MENISCECTOMY (Right)  REMOVAL, LOOSE BODY, JOINT, ARTHROSCOPIC (Right) Day of Surgery    Plan:     During this hospitalization, patient does not require further acute PT services.  Please re-consult if situation changes.      Subjective     Chief Complaint: right knee scope  Patient/Family Comments/goals: Pt is agreeable to PT   Pain/Comfort:  Pain Rating 1: 0/10  Pain Rating Post-Intervention 1: 0/10    Patients cultural, spiritual, Zoroastrian conflicts given the current situation: no    Living Environment:  Pt lives at home with wife  Prior to admission, patients level of function was independent.  Equipment used at home: none.  DME owned (not currently used): none.  Upon discharge, patient will have assistance from family.    Objective:     Communicated with RN prior to session.  Patient found HOB elevated with peripheral IV upon PT entry to room.    General Precautions: Standard, fall    Orthopedic Precautions:RLE non weight bearing   Braces: N/A  Respiratory Status: Room air    Exams:  Cognitive Exam:  Patient is oriented to Person, Place, Time, and Situation  Gross Motor Coordination:  WFL    Functional Mobility:  Bed  Mobility:     Scooting: supervision  Supine to Sit: supervision  Transfers:     Sit to Stand:  contact guard assistance with axillary crutches  Gait: 80 ft contact guard assistance, TTWB with crutches  Balance: good  Stairs:  Pt ascended/descended 3 stair(s) with Axillary crutches with no handrails with Contact Guard Assistance.     AM-PAC 6 CLICK MOBILITY  Total Score:23       Treatment and Education:  Educated on crutches and home exercise program     AM-PAC 6 CLICK MOBILITY  Total Score:23     Patient left sitting edge of bed with all lines intact and call button in reach.    GOALS:   Multidisciplinary Problems       Physical Therapy Goals          Problem: Physical Therapy    Goal Priority Disciplines Outcome Goal Variances Interventions   Physical Therapy Goal     PT, PT/OT Ongoing, Progressing     Description: STG:  Pt will be independent in home exercise program   Pt will be independent in gait using crutches with NWB: right lower extremity     LTG:  Pt will return home to prior level of function with all mobility                        History:     Past Medical History:   Diagnosis Date    GERD (gastroesophageal reflux disease)     H/O colonoscopy with polypectomy 01/30/2023    Hyperlipidemia     Hypertension        History reviewed. No pertinent surgical history.    Time Tracking:     PT Received On: 12/15/23  PT Start Time: 1510     PT Stop Time: 1526  PT Total Time (min): 16 min     Billable Minutes: Evaluation low complexity      12/15/2023

## 2023-12-15 NOTE — INTERVAL H&P NOTE
The patient has been examined and the H&P has been reviewed:    I concur with the findings and no changes have occurred since H&P was written.    Anesthesia/Surgery risks, benefits and alternative options discussed and understood by patient/family.      Will also proceed with left plantar fascia injection    There are no hospital problems to display for this patient.

## 2023-12-15 NOTE — ANESTHESIA PREPROCEDURE EVALUATION
12/15/2023  Earnest Gonzalez is a 63 y.o., male.      Pre-op Assessment    I have reviewed the Patient Summary Reports.     I have reviewed the Nursing Notes. I have reviewed the NPO Status.   I have reviewed the Medications.     Review of Systems  Anesthesia Hx:             Denies Family Hx of Anesthesia complications.    Denies Personal Hx of Anesthesia complications.                    Social:  Non-Smoker, No Alcohol Use       Hematology/Oncology:  Hematology Normal   Oncology Normal                                   EENT/Dental:  EENT/Dental Normal           Cardiovascular:     Hypertension   Denies MI. CAD  asymptomatic                                      Renal/:  Renal/ Normal                 Hepatic/GI:     GERD             Musculoskeletal:  Musculoskeletal Normal                Neurological:  Neurology Normal   Denies CVA.                                    Endocrine:  Endocrine Normal            Dermatological:  Skin Normal    Psych:    depression                Physical Exam  General: Well nourished, Cooperative, Alert and Oriented    Airway:  Mallampati: II / II  Mouth Opening: Normal  TM Distance: Normal  Neck ROM: Normal ROM    Dental:  Intact    Chest/Lungs:  Clear to auscultation    Heart:  Rate: Normal  Rhythm: Regular Rhythm  Sounds: Normal        Chemistry        Component Value Date/Time     11/28/2023 1303    K 3.7 11/28/2023 1303     (H) 11/28/2023 1303    CO2 28 11/28/2023 1303    BUN 21 (H) 11/28/2023 1303    CREATININE 1.10 11/28/2023 1303     (H) 11/28/2023 1303        Component Value Date/Time    CALCIUM 9.2 11/28/2023 1303    ALKPHOS 90 09/28/2021 1509    AST 18 09/28/2021 1509    ALT 33 09/28/2021 1509    BILITOT 0.4 09/28/2021 1509    EGFRNONAA 86 09/28/2021 1509        Lab Results   Component Value Date    WBC 10.91 09/28/2021    HGB 13.9 09/28/2021    HCT  43.3 09/28/2021     09/28/2021     No results found for this or any previous visit.      Anesthesia Plan  Type of Anesthesia, risks & benefits discussed:    Anesthesia Type: Gen Supraglottic Airway  Intra-op Monitoring Plan: Standard ASA Monitors  Post Op Pain Control Plan: multimodal analgesia  Induction:  IV  Airway Plan: Direct  Informed Consent: Informed consent signed with the Patient and all parties understand the risks and agree with anesthesia plan.  All questions answered.   ASA Score: 3  Day of Surgery Review of History & Physical: H&P Update referred to the surgeon/provider.I have interviewed and examined the patient. I have reviewed the patient's H&P dated: There are no significant changes.     Ready For Surgery From Anesthesia Perspective.     .

## 2023-12-15 NOTE — OR NURSING
1415-Pt rec'd to RR sedated, unresponsive to stimulation, on 6L O2 via simple fm, NADN, SaO2-88% and increasing, oral airway in, resp even et unlabored, IV fluids infusing, dressing to right leg is C/D/I, cap refill < 3 secs, toes are warm to touch, + dorsalis pedis pulse, will con't to monitor, safety measures in effect.    1430-Pt drowsy, responds to stimulation, placed on room air, oral airway removed, Sao2-97%, no c/o pain, able to wiggle toes, denies any c/o numbness/tingling, will con't to monitor.    1445-Pt slightly drowsy, no c/o pain, NADN, orders to discharge to ASC room 23.    1450-Pt care released to Rosalee(RN), pt awake with wife at bedside, vs hr-61, resp-15, b/p 100/70,  SaO2-95%.

## 2023-12-15 NOTE — ANESTHESIA PROCEDURE NOTES
Intubation    Date/Time: 12/15/2023 1:31 PM    Performed by: Familia Stone CRNA  Authorized by: Ras Dacosta MD    Intubation:     Induction:  Intravenous    Intubated:  Postinduction    Mask Ventilation:  Easy mask    Attempts:  1    Attempted By:  CRNA    Difficult Airway Encountered?: No      Complications:  None    Airway Device:  Supraglottic airway/LMA    Airway Device Size:  5.0    Style/Cuff Inflation:  Cuffed (inflated to minimal occlusive pressure)    Placement Verified By:  Capnometry    Complicating Factors:  None    Findings Post-Intubation:  BS equal bilateral

## 2023-12-15 NOTE — PLAN OF CARE
Menisectomy and Debridement                                                                                            Post-Operative Protocol    Small microfracture was performed recommend nonweightbearing completely for 2 weeks followed by partial weight-bearing for 2 weeks followed by full weight-bearing 4 weeks  Phase I - Maximum Protection (Week 0 to 1):    0 to 1 week:  Ice and modalities as needed to reduce pain and inflammation  Use crutches for 2 to 5 days to help reduce swelling, the patient may discontinue crutches when able to walk without a limp or pain  Elevate the knee above the heart for the first three to five days  Initiate patella mobility drills  Begin full active/passive knee range of motion exercises  Quadriceps setting focusing on VMO function  Multi-plane open kinetic chain straight leg raising  Gait training  Begin stationary bike as swelling and pain allow    Phase II - Progressive Stretching and Early Strengthening Phase (Weeks 1 to 4):    Weeks 1 to 4:  Maintain program from week 0 to 1  Continue modalities as needed  Initiate lower extremity stretching  Begin treadmill and/or elliptical  as strength and swelling allow, avoid impact activities  Begin bilateral closed kinetic chain strengthening progressing to unilateral as tolerated  Promote normal patellofemoral arthrokinematics  Implement reintegration exercises emphasizing core stability exercises  Begin closed kinetic chain multi-plane hip exercises  Manual lower extremity PNF patterns  Proprioception drills emphasizing neuromuscular control    Phase III - Advanced Strengthening and Proprioception Phase (Weeks 4 to 6):    Weeks 4 to 6:  Modalities as needed  Continue with phase II exercises as indicated  Advance time and intensity on cardiovascular program-no running  Begin functional cord resistance program  Initiate gym strengthening program 3 times per week, including leg press, squats, lunges, knee  extensions (30° to 0° progressing to full range as PF arthtokinematics normalize), hamstring curls, ab/adduction, and calf raises  Begin pool running program          Phase IV - Advanced Strengthening Phase (Weeks 6 to 7):    Weeks 6 to 7:  Implement a full gym-strengthening program  Begin running program    Phase V - Return to Sports Phase (Week 8):    Week 8:  Follow-up examination with the physician  Continue with aggressive lower extremity strengthening, stretching, and cardiovascular training  Implement sport specific multi-directional drills  Initiate plyometric exercises beginning with bilateral progressing to unilateral  Sports test for return to play                         Yes

## 2023-12-15 NOTE — OP NOTE
[facility] - Orthopedic Periop Services  Operative Note    Surgery Date: 12/15/2023      Surgeon(s) and Role:     * Son Pike MD - Primary    Assistant: Sina Nuñez    Pre-op Diagnosis:   Complex tear of lateral meniscus of right knee as current injury, subsequent encounter [S83.271D]     Post-op Diagnosis:  Post-Op Diagnosis Codes:     * Complex tear of lateral meniscus of right knee as current injury, subsequent encounter [S83.271D]   Complex medial meniscus tear right knee  Osteoarthritis of the patellofemoral compartment  Grade 4 chondral defect of lateral femoral condyle  Loose body right knee    Procedure:    Right knee arthroscopic medial and lateral meniscectomies  Right knee arthroscopic abrasion chondroplasty/microfracture of grade 4 lateral femoral condyle defect measuring 1 cm x 1 cm in size  Right knee arthroscopic loose body removal of loose body from the patellofemoral compartment  Right knee arthroscopic chondroplasty of the patellofemoral compartment  Anesthesia:  General    EBL:  5cc    Implants: * No implants in log *    Tourniquet time: 0 mins    Complication:   none        INDICATIONS:  The patient is a 63 y.o. year-old who had a history and physical examination consistent with the aforementioned diagnoses.  The risks and benefits were discussed with the patient.  The patient acknowledged an understanding and wished to proceed with operative intervention.  Informed consent was obtained prior to the procedure.  Details of the surgical procedure were explained including incisions, probable rehabilitation course and description of the hardware and graft to be used was given.  The patient understands the likely length of convalescence after surgery and we reasonably explained the risks, benefits and alternatives to surgery.  The reasonable expectations and potential complications were discussed and acknowledged including, but not limited to, infection, bleeding, blood clots, nerve injury, retear,  instability and continued pain and stiffness.  It was also explained that there was a chance of failure, which may require further management.  The patient agreed, understood and wished to proceed.      Procedure: The patient was taken to the operating room and placed supine.  Anesthesia was administered and pre-operative antibiotics were given.  A timeout was performed.  Patient was positioned appropriately and prepped and draped in the usual sterile fashion.  Standard anteromedial and anterolateral portals were established and a diagnostic arthroscopy was performed; systemic examination of the joint revealed the following:     PF  negative compartmentalization or adhesions in the suprapatellar pouch.   Trochlear chondromalacia:grade III  Patella chondromalacia: grade III    Medial  Medial femoral chondyle chondromalacia : Grade I-II  Medial tibial plateau chondromalacia none.  Positive for meniscus tear.  This was a horizontal cleavage-type tear involving the posterior horn of the medial meniscus.  I used a combination of biters and Yo to debride this back to a stable base as this horizontal cleavage tear extending from the root attachment to the junction with the anterior and posterior horn.  After performing a thorough debridement also performed a chondroplasty of the grade 2 chondromalacia of the medial femoral condyle    Lateral  Lateral femoral condyle chondromalacia: grade III  Lateral tibial plateau chondromalacia: grade I  Positive for meniscus tear.  There was a focal grade 4 chondral defect seen on the lateral femoral condyle weight-bearing surface.  This measured 1 cm x 1 cm in size.  I used a curette to create sharp vertical margins surrounding the periphery of the lesion and remove the calcified chondral layer then utilized a microfracture awl to complete the microfracture spaced about 3-4 mm apart throughout the defect.  I then turned my attention towards the meniscus were displaced flap of  lateral meniscus posterior root attachment had flipped into the notch and this was debrided with a shaver and the tear that extended into the root attachment was not complete but rather represented a partial tear this was debrided with a motorized shaver back to a stable base.  There was also fraying of the meniscus along the peripheral rim I debrided this with a shaver as well to the [employer] stable base.  ACL PCL demonstrated partial-thickness tearing this was likewise debrided attention was then turned towards loose body in the patellofemoral compartment which was removed motorized shaver and a chondroplasty performed over the femur along the aspect of the trochlea along the patella as well                        This completed the procedure all instruments were removed. Portals were closed with 3-0 nylon.  0.25% bupivacaine was injected along the portal sites and sterile dressings were applied.           Disposition:awakened from anesthesia, extubated and taken to the recovery room in a stable condition, having suffered no apparent untoward event.

## 2023-12-15 NOTE — TRANSFER OF CARE
Anesthesia Transfer of Care Note    Patient: Earnest Gonzalez    Procedure(s) Performed: Procedure(s) (LRB):  ARTHROSCOPY, KNEE, WITH CHONDROPLASTY (Right)  ARTHROSCOPY, KNEE, WITH MENISCECTOMY (Right)  REMOVAL, LOOSE BODY, JOINT, ARTHROSCOPIC (Right)    Patient location: PACU    Anesthesia Type: general    Transport from OR: Transported from OR on room air with adequate spontaneous ventilation    Post pain: adequate analgesia    Post assessment: no apparent anesthetic complications    Post vital signs: stable    Level of consciousness: sedated    Nausea/Vomiting: no nausea/vomiting    Complications: none    Transfer of care protocol was followed      Last vitals: Visit Vitals  BP (!) 87/57   Pulse 64   Temp 36.5 °C (97.7 °F) (Oral)   Resp 19   Wt 93.4 kg (206 lb)   SpO2 (!) 88%   BMI 27.18 kg/m²

## 2023-12-18 DIAGNOSIS — Z98.890 S/P RIGHT KNEE ARTHROSCOPY: Primary | ICD-10-CM

## 2023-12-18 NOTE — ANESTHESIA POSTPROCEDURE EVALUATION
Anesthesia Post Evaluation    Patient: Earnest Gonzalez    Procedure(s) Performed: Procedure(s) (LRB):  ARTHROSCOPY, KNEE, WITH CHONDROPLASTY (Right)  ARTHROSCOPY, KNEE, WITH MENISCECTOMY (Right)  REMOVAL, LOOSE BODY, JOINT, ARTHROSCOPIC (Right)    Final Anesthesia Type: general      Patient location during evaluation: PACU  Patient participation: Yes- Able to Participate  Level of consciousness: awake and alert  Post-procedure vital signs: reviewed and stable  Pain management: adequate  Airway patency: patent  ABDIFATAH mitigation strategies: Multimodal analgesia  PONV status at discharge: No PONV  Anesthetic complications: no      Cardiovascular status: blood pressure returned to baseline  Respiratory status: unassisted  Hydration status: euvolemic  Follow-up not needed.              Vitals Value Taken Time   /71 12/15/23 1501   Temp 36.5 °C (97.7 °F) 12/15/23 1420   Pulse 60 12/15/23 1509   Resp 15 12/15/23 1450   SpO2 98 % 12/15/23 1509   Vitals shown include unvalidated device data.      Event Time   Out of Recovery 14:45:00         Pain/Melissa Score: No data recorded

## 2023-12-21 ENCOUNTER — CLINICAL SUPPORT (OUTPATIENT)
Dept: REHABILITATION | Facility: HOSPITAL | Age: 63
End: 2023-12-21
Payer: COMMERCIAL

## 2023-12-21 DIAGNOSIS — R29.898 WEAKNESS OF RIGHT LOWER EXTREMITY: Primary | ICD-10-CM

## 2023-12-21 DIAGNOSIS — Z98.890 S/P RIGHT KNEE ARTHROSCOPY: ICD-10-CM

## 2023-12-21 PROCEDURE — 97110 THERAPEUTIC EXERCISES: CPT

## 2023-12-21 PROCEDURE — 97161 PT EVAL LOW COMPLEX 20 MIN: CPT

## 2023-12-21 PROCEDURE — 97530 THERAPEUTIC ACTIVITIES: CPT

## 2023-12-21 NOTE — PLAN OF CARE
"[facility]   Physical Therapy Initial Evaluation      Name: Earnest Gonzalez  Clinic Number: [id number]    Therapy Diagnosis:   Encounter Diagnoses   Name Primary?    S/P right knee arthroscopy     Weakness of right lower extremity Yes        Physician: Son Pike MD    Physician Orders: PT Eval and Treat   Medical Diagnosis from Referral: s/p R knee arthroscopy   Evaluation Date: 12/21/2023  Authorization Period Expiration: 12/17/2024  Plan of Care Expiration: 01/19/2024  Progress Note Due: 01/19/2024  Date of Surgery: 12/15/2023  Visit # / Visits authorized: 1/8  FOTO: to be completed on visit 4    Precautions: Standard     Time In: 8:01   Time Out: 8:51  Total Billable Time: 50 minutes (42 minutes billable and 8 minutes not billed for cold pack)     Subjective   Date of onset: 12/15/2023     History of current condition - Josias reports: R knee scope on 12/15/2023 for meniscus repair and debridement. He reports that he has had minimal pain since surgery. He reports occasional episodes of sharp knee pain in his R knee with certain movements. He desires to return to hunting as soon as possible.     Falls: None     Imaging: MRI studies, and Xrays prior to surgery    Prior Therapy: None   Social History:  lives with their spouse  Occupation: Retired   Prior Level of Function: Independent   Current Level of Function: Independent     Pain:  Current 0/10, worst 7/10, best 0/10   Location: right knee    Description: Aching and Sharp  Aggravating Factors: Stairs   Easing Factors: rest    Patients goals: "I am ready to get back to hunting."      Medical History:   Past Medical History:   Diagnosis Date    GERD (gastroesophageal reflux disease)     H/O colonoscopy with polypectomy 01/30/2023    Hyperlipidemia     Hypertension        Surgical History:   Earnest Gonzalez  has a past surgical history that includes Arthroscopic chondroplasty of knee joint (Right, 12/15/2023); Knee arthroscopy w/ " meniscectomy (Right, 12/15/2023); and Arthroscopic removal of loose body from joint (Right, 12/15/2023).    Medications:   Earnest has a current medication list which includes the following prescription(s): albuterol, cephalexin, cetirizine, diltiazem, duloxetine, naproxen, omeprazole, ondansetron, oxycodone-acetaminophen, rosuvastatin, and sertraline.    Allergies:   Review of patient's allergies indicates:  No Known Allergies     Objective    Incisions: Patient's inicisions appear to be healing well with no signs of drainage or infection.   Girth Measurements: Right 37 cm at midpatella vs. Left 36.5 cm at midpatella     Range of motion:  Motion Right Left    Hip flexion  WITHIN FUNCTIONAL LIMITS  WITHIN FUNCTIONAL LIMITS   Hip extension  WITHIN FUNCTIONAL LIMITS  WITHIN FUNCTIONAL LIMITS   Hip abduction  WITHIN FUNCTIONAL LIMITS  WITHIN FUNCTIONAL LIMITS   Hip adduction  WITHIN FUNCTIONAL LIMITS  WITHIN FUNCTIONAL LIMITS   Knee extension   0 degrees   with 0 degree quad lag   WITHIN FUNCTIONAL LIMITS   Knee flexion   125 degrees   WITHIN FUNCTIONAL LIMITS   Ankle DF  WITHIN FUNCTIONAL LIMITS  WITHIN FUNCTIONAL LIMITS   Ankle PF  WITHIN FUNCTIONAL LIMITS  WITHIN FUNCTIONAL LIMITS   Ankle Inversion  WITHIN FUNCTIONAL LIMITS  WITHIN FUNCTIONAL LIMITS   Ankle Eversion  WITHIN FUNCTIONAL LIMITS  WITHIN FUNCTIONAL LIMITS     Manual muscle test   Muscle Right  Left    Hip flexion  MMT strength: 4-/5  MMT strength: 5/5   Hip extension  MMT strength: 4-/5  MMT strength: 5/5   Hip abduction  MMT strength: 4-/5  MMT strength: 5/5   Hip adduction  MMT strength: 4-/5  MMT strength: 5/5   Knee extension  MMT strength: 3+/5  MMT strength: 5/5   Knee flexion  MMT strength: 3+/5  MMT strength: 5/5   Ankle DF  MMT strength: 4/5  MMT strength: 5/5   Ankle PF  MMT strength: 4/5  MMT strength: 5/5   Ankle inversion  MMT strength: 4/5  MMT strength: 5/5   Ankle eversion  MMT strength: 4/5  MMT strength: 5/5     Gait:  Patient ambulated  "into clinic full weight bearing on R LE with no assistive device although his post operative protocol states that he should be non-weight bearing for two weeks. Patient reports that he was using crutches up until yesterday.     Patient ambulates with decreased R heelstrike and decreased R knee extension during stance.     Clinical Special Tests:  N/A post operative       Intake Outcome Measure for FOTO Survey    Therapist reviewed FOTO scores for Earnest Gonzalez on 12/21/2023.   FOTO report - see Media section or FOTO account episode details.    Intake Score: 61%       Treatment   Total Treatment time (time-based codes) separate from Evaluation: 38  minutes  (30 minutes billed and 8 minutes not billed for ice)     Josias received the treatments listed below:      therapeutic exercises to develop strength, endurance, ROM, and flexibility for 20 minutes including:  See flowsheet below     therapeutic activities to improve functional performance for 10  minutes, including: see flowsheet below  Ther- Ex Reps    Nustep 6 minutes    Wedge 1 minute    Hamstring Stretch 2 x 20"    Heelslides 20 x    LAQs  2 x 10 3"    Hamstring Curls 2 x 10 red TB    Quad sets  2 x 10 3"    4 way hip 10 x each direction    Straight leg raises with ER  10 x            Ther-Act Reps   Heel Raises 2 x 10    Mini Squats 2 x 10    3 way hip    Sit <> stands     Forward Step ups 2 x 10    Lateral Step ups                             cold pack for 10 minutes to  R knee with elevation above heart level to decrease post exercise irritation and edema .    Patient Education and Home Exercises     Education provided:   - eval findings, plan of care, Home exercise program, and post surgical restrictions     Written Home Exercises Provided: yes. Exercises were reviewed and Josias was able to demonstrate them prior to the end of the session.  Josias demonstrated good  understanding of the education provided. See EMR under Patient Instructions for exercises provided " during therapy sessions.    Assessment     Earnest is a 63 y.o. male referred to outpatient Physical Therapy with a medical diagnosis of s/p R knee arthroscope- meniscectomy and debridement. Patient presents with R knee pain, decreased R LE muscle strength and decreased motor control of R LE. Patient's impairments are limiting his overall activity tolerance at this time.     PT provided patient with visual, verbal, and tactile cueing throughout session for proper LE alignment, form, hold times, and increased eccentric control with exercises. PT noted minimally decreased R LE motor control with standing tasks. PT educated on post surgical restrictions. PT progressed patient with standing tasks per MD protocol due to patient not being compliant with non weight bearing at this time. No adverse effects noted to treatment tasks.     Patient prognosis is Good.   Patient will benefit from skilled outpatient Physical Therapy to address the deficits stated above and in the chart below, provide patient /family education, and to maximize patientt's level of independence.     Plan of care discussed with patient: Yes  Patient's spiritual, cultural and educational needs considered and patient is agreeable to the plan of care and goals as stated below:     Anticipated Barriers for therapy: compliance with home exercise program and treatment, compliance with post surgical restrictions    Medical Necessity is demonstrated by the following  History  Co-morbidities and personal factors that may impact the plan of care [x] LOW: no personal factors / co-morbidities  [] MODERATE: 1-2 personal factors / co-morbidities  [] HIGH: 3+ personal factors / co-morbidities    Moderate / High Support Documentation:   Co-morbidities affecting plan of care: NA    Personal Factors:   no deficits     Examination  Body Structures and Functions, activity limitations and participation restrictions that may impact the plan of care [x] LOW: addressing 1-2  elements  [] MODERATE: 3+ elements  [] HIGH: 4+ elements (please support below)    Moderate / High Support Documentation: NA     Clinical Presentation [x] LOW: stable  [] MODERATE: Evolving  [] HIGH: Unstable     Decision Making/ Complexity Score: low       Goals:  Short Term Goals: 2 weeks   Patient will independently complete Home exercise program with correct form.   Patient will report R knee pain at worst of less than or equal to 5/10 for improved quality of life.     Long Term Goals: 4 weeks   Patient will report decreased R knee pain with going up and down stairs to less than or equal to 3/10 for improved quality of life.  Pt will increase R knee strength to 4+/5 to allow patient to safely return to prior level of function   Patient will ambulate community distance with improved gait mechanics.   Patient will have increased FOTO score to greater than or equal to 75% for increased function.     Plan   Plan of care Certification: 12/21/2023 to 01/19/2023.    Outpatient Physical Therapy 2 times weekly for 4 weeks to include the following interventions: Electrical Stimulation unattended, Gait Training, Manual Therapy, Patient Education, Therapeutic Activities, and Therapeutic Exercise.     Alessandro Jessica, PT, DPT         Physician's Signature: _________________________________________ Date: ________________

## 2023-12-22 ENCOUNTER — OFFICE VISIT (OUTPATIENT)
Dept: ORTHOPEDICS | Facility: CLINIC | Age: 63
End: 2023-12-22
Payer: COMMERCIAL

## 2023-12-22 DIAGNOSIS — Z98.890 STATUS POST ARTHROSCOPY OF RIGHT KNEE: Primary | ICD-10-CM

## 2023-12-22 PROCEDURE — 4010F ACE/ARB THERAPY RXD/TAKEN: CPT | Mod: ,,, | Performed by: NURSE PRACTITIONER

## 2023-12-22 PROCEDURE — 99212 OFFICE O/P EST SF 10 MIN: CPT | Mod: PBBFAC | Performed by: NURSE PRACTITIONER

## 2023-12-22 PROCEDURE — 4010F PR ACE/ARB THEARPY RXD/TAKEN: ICD-10-PCS | Mod: ,,, | Performed by: NURSE PRACTITIONER

## 2023-12-22 PROCEDURE — 1159F MED LIST DOCD IN RCRD: CPT | Mod: ,,, | Performed by: NURSE PRACTITIONER

## 2023-12-22 PROCEDURE — 1160F RVW MEDS BY RX/DR IN RCRD: CPT | Mod: ,,, | Performed by: NURSE PRACTITIONER

## 2023-12-22 PROCEDURE — 99024 PR POST-OP FOLLOW-UP VISIT: ICD-10-PCS | Mod: ,,, | Performed by: NURSE PRACTITIONER

## 2023-12-22 PROCEDURE — 1160F PR REVIEW ALL MEDS BY PRESCRIBER/CLIN PHARMACIST DOCUMENTED: ICD-10-PCS | Mod: ,,, | Performed by: NURSE PRACTITIONER

## 2023-12-22 PROCEDURE — 1159F PR MEDICATION LIST DOCUMENTED IN MEDICAL RECORD: ICD-10-PCS | Mod: ,,, | Performed by: NURSE PRACTITIONER

## 2023-12-22 PROCEDURE — 99024 POSTOP FOLLOW-UP VISIT: CPT | Mod: ,,, | Performed by: NURSE PRACTITIONER

## 2023-12-22 NOTE — PATIENT INSTRUCTIONS
Safety guidelines and activity restrictions discussed with the patient.  Continue physical therapy efforts.  Sutures removed Steri-Strips applied.  Return orthopedic clinic in 4 weeks follow-up Dr. Tom Pike or sooner as needed.

## 2023-12-22 NOTE — PROGRESS NOTES
63-year-old male patient presents ambulatory orthopedic clinic follow-up of right knee arthroscopy by Dr. Son Pike on 12/15/2023.  He reports resolution of pain symptoms.  Denies fever, chills or any sinus symptom flexion.  States he was only taking 3 of his pain medications.  Does not require further pain medication.      PE:  Physical exam right knee he is noted be ambulating independently no perceptible limp.  He does have a knee brace in place.  The knee brace is removed.  Skin is warm dry.  Portal sites healing well without sign or symptom flexion.  No soft tissue swelling noted.  No intra-articular effusion appreciated clinically.  Range motion right knee 0° extension with further flexion to approximately 120°.      Impression:  1 week following right knee arthroscopy    Plan:  Safety guidelines and activity restrictions discussed with the patient.  Continue physical therapy efforts.  Sutures removed Steri-Strips applied.  Return orthopedic clinic in 4 weeks follow-up Dr. Tom Pike or sooner as needed.

## 2023-12-26 NOTE — DISCHARGE SUMMARY
[facility] - Orthopedic Periop Services  Discharge Note  Short Stay    Procedure(s) (LRB):  ARTHROSCOPY, KNEE, WITH CHONDROPLASTY (Right)  ARTHROSCOPY, KNEE, WITH MENISCECTOMY (Right)  REMOVAL, LOOSE BODY, JOINT, ARTHROSCOPIC (Right)      OUTCOME: Patient tolerated treatment/procedure well without complication and is now ready for discharge.    DISPOSITION: Home or Self Care    FINAL DIAGNOSIS:  Complex tear of lateral meniscus of right knee    FOLLOWUP: In clinic    DISCHARGE INSTRUCTIONS:    Discharge Procedure Orders   Ambulatory referral/consult to Physical/Occupational Therapy   Standing Status: Future   Referral Priority: Routine Referral Type: Physical Medicine   Referral Reason: Specialty Services Required   Number of Visits Requested: 1     Diet general     Remove dressing in 72 hours   Order Comments: Remove dressing in 3 days.  Place band-aids over portal sites.     Call MD for:  temperature >100.4     Call MD for:  persistent nausea and vomiting     Call MD for:  severe uncontrolled pain     Call MD for:  difficulty breathing, headache or visual disturbances     Call MD for:  redness, tenderness, or signs of infection (pain, swelling, redness, odor or green/yellow discharge around incision site)     Call MD for:  hives     Call MD for:  persistent dizziness or light-headedness     Call MD for:  extreme fatigue     Weight bearing restrictions (specify)   Order Comments: Please refer to op note for therapy plan        TIME SPENT ON DISCHARGE: 9 minutes

## 2023-12-29 ENCOUNTER — CLINICAL SUPPORT (OUTPATIENT)
Dept: REHABILITATION | Facility: HOSPITAL | Age: 63
End: 2023-12-29
Payer: COMMERCIAL

## 2023-12-29 DIAGNOSIS — R29.898 WEAKNESS OF RIGHT LOWER EXTREMITY: Primary | ICD-10-CM

## 2023-12-29 PROCEDURE — 97530 THERAPEUTIC ACTIVITIES: CPT | Mod: CQ

## 2023-12-29 PROCEDURE — 97110 THERAPEUTIC EXERCISES: CPT | Mod: CQ

## 2023-12-29 NOTE — PROGRESS NOTES
"[facility]   Physical Therapy Treatment Note      Name: Earnest Gonzalez  Clinic Number: [id number]    Therapy Diagnosis:        Encounter Diagnoses   Name Primary?    S/P right knee arthroscopy      Weakness of right lower extremity Yes        Physician: Son Pike MD     Physician Orders: PT Eval and Treat   Medical Diagnosis from Referral: s/p R knee arthroscopy   Evaluation Date: 12/21/2023  Authorization Period Expiration: 12/17/2024  Plan of Care Expiration: 01/19/2024  Progress Note Due: 01/19/2024  Date of Surgery: 12/15/2023  Visit # / Visits authorized: 2/8  FOTO: to be completed on visit 4     Precautions: Standard      Time In: 10:26  Time Out: 11:00  Total Billable Time:  34 minutes   Visit Date: 12/29/2023      PTA Visit #: 1/5       Subjective     Patient reports: "I've already walked 5 miles this morning.  I'm tired but I'm not hurting".   .  He was compliant with home exercise program.  Response to previous treatment: No adverse effects noted   Functional change: Early in Plan of Care     Pain: 0/10  Location: right knee      Objective      Objective Measures updated at progress report unless specified.     Treatment     Josias received the treatments listed below:    therapeutic exercises to develop strength, endurance, ROM, and flexibility.  See flow-sheet below:       Ther- Ex Reps    Nustep 10 * minutes    Wedge 2 minutes *    Hamstring Stretch 2 x 20"    Heelslides 20 x    LAQs  2 x 10 3"    Hamstring Curls 2 x 10 red TB    Quad sets  3 x 10, 5" *    4 way hip 10 x each direction    Straight leg raises with ER  10 x                    therapeutic activities to improve static and dynamic functional performance.  See flow-sheet below:   Ther-Act Reps   Heel Raises 3 x 10 *   Mini Squats 2 x 10    3 way hip     Sit <> stands  2 x 10 *   Forward Step ups 2 x 10    Lateral Step ups                                         cold pack for 10 minutes to  R knee with elevation " above heart level to decrease post exercise irritation and edema     Patient Education and Home Exercises       Education provided:   - Plan of Care, Home exercise program,Delayed onset muscle soreness     Written Home Exercises Provided: Patient instructed to cont prior HEP. Exercises were reviewed and Josias was able to demonstrate them prior to the end of the session.  Josias demonstrated good  understanding of the education provided. See Electronic Medical Record under Patient Instructions for exercises provided during therapy sessions    Assessment     Earnest is a 63 y.o. male referred to outpatient Physical Therapy with a medical diagnosis of s/p R knee arthroscope- meniscectomy and debridement. Patient presents with R knee pain, decreased R LE muscle strength and decreased motor control of R LE. Patient's impairments are limiting his overall activity tolerance at this time.  Patient requires min verbal and visual cues to progress through Therapeutic exercises with proper alignment, speed of movement, count, and hold times. Patient requires cues x 3 to complete 4 way hip without compensations.  No adverse effects noted.     Josias Is progressing well towards his goals.   Patient prognosis is Good.     Patient will continue to benefit from skilled outpatient physical therapy to address the deficits listed in the problem list box on initial evaluation, provide pt/family education and to maximize pt's level of independence in the home and community environment.     Patient's spiritual, cultural and educational needs considered and pt agreeable to plan of care and goals.     Anticipated barriers to physical therapy:  compliance with home exercise program and treatment, compliance with post surgical restrictions     Goals:   Short Term Goals: 2 weeks   Patient will independently complete Home exercise program with correct form.   Patient will report R knee pain at worst of less than or equal to 5/10 for improved quality of  life.      Long Term Goals: 4 weeks   Patient will report decreased R knee pain with going up and down stairs to less than or equal to 3/10 for improved quality of life.  Pt will increase R knee strength to 4+/5 to allow patient to safely return to prior level of function   Patient will ambulate community distance with improved gait mechanics.   Patient will have increased FOTO score to greater than or equal to 75% for increased function.     Plan     Plan of care Certification: 12/21/2023 to 01/19/2023.  Outpatient Physical Therapy 2 times weekly for 4 weeks to include the following interventions: Electrical Stimulation unattended, Gait Training, Manual Therapy, Patient Education, Therapeutic Activities, and Therapeutic Exercise.     Continue Plan of Care Per PT order to progress patient toward rehab goals as tolerated by patient.   Loreta Wing, PTA  12/29/2023

## 2024-01-03 ENCOUNTER — OFFICE VISIT (OUTPATIENT)
Dept: PRIMARY CARE CLINIC | Facility: CLINIC | Age: 64
End: 2024-01-03
Payer: COMMERCIAL

## 2024-01-03 VITALS
WEIGHT: 202 LBS | SYSTOLIC BLOOD PRESSURE: 110 MMHG | HEART RATE: 78 BPM | DIASTOLIC BLOOD PRESSURE: 75 MMHG | OXYGEN SATURATION: 97 % | RESPIRATION RATE: 18 BRPM | BODY MASS INDEX: 26.77 KG/M2 | HEIGHT: 73 IN | TEMPERATURE: 97 F

## 2024-01-03 DIAGNOSIS — M19.90 ARTHRITIS: ICD-10-CM

## 2024-01-03 DIAGNOSIS — I25.10 CORONARY ARTERY DISEASE INVOLVING NATIVE CORONARY ARTERY OF NATIVE HEART, UNSPECIFIED WHETHER ANGINA PRESENT: Primary | ICD-10-CM

## 2024-01-03 DIAGNOSIS — J06.9 UPPER RESPIRATORY TRACT INFECTION, UNSPECIFIED TYPE: ICD-10-CM

## 2024-01-03 PROBLEM — M15.9 OSTEOARTHRITIS INVOLVING MULTIPLE JOINTS ON BOTH SIDES OF BODY: Status: ACTIVE | Noted: 2024-01-03

## 2024-01-03 PROBLEM — S06.890A: Status: ACTIVE | Noted: 2024-01-03

## 2024-01-03 PROCEDURE — 3078F DIAST BP <80 MM HG: CPT | Mod: CPTII,,, | Performed by: FAMILY MEDICINE

## 2024-01-03 PROCEDURE — 3074F SYST BP LT 130 MM HG: CPT | Mod: CPTII,,, | Performed by: FAMILY MEDICINE

## 2024-01-03 PROCEDURE — 4010F ACE/ARB THERAPY RXD/TAKEN: CPT | Mod: CPTII,,, | Performed by: FAMILY MEDICINE

## 2024-01-03 PROCEDURE — 96372 THER/PROPH/DIAG INJ SC/IM: CPT | Mod: ,,, | Performed by: FAMILY MEDICINE

## 2024-01-03 PROCEDURE — 1160F RVW MEDS BY RX/DR IN RCRD: CPT | Mod: CPTII,,, | Performed by: FAMILY MEDICINE

## 2024-01-03 PROCEDURE — 3008F BODY MASS INDEX DOCD: CPT | Mod: CPTII,,, | Performed by: FAMILY MEDICINE

## 2024-01-03 PROCEDURE — 1159F MED LIST DOCD IN RCRD: CPT | Mod: CPTII,,, | Performed by: FAMILY MEDICINE

## 2024-01-03 PROCEDURE — 99213 OFFICE O/P EST LOW 20 MIN: CPT | Mod: 25,,, | Performed by: FAMILY MEDICINE

## 2024-01-03 RX ORDER — DIAZEPAM 10 MG/1
TABLET ORAL
COMMUNITY

## 2024-01-03 RX ORDER — FLUTICASONE PROPIONATE 50 MCG
SPRAY, SUSPENSION (ML) NASAL
COMMUNITY

## 2024-01-03 RX ORDER — ROSUVASTATIN CALCIUM 20 MG/1
20 TABLET, COATED ORAL NIGHTLY
COMMUNITY
Start: 2023-10-20

## 2024-01-03 RX ORDER — IMIPRAMINE HYDROCHLORIDE 25 MG/1
TABLET, FILM COATED ORAL
COMMUNITY

## 2024-01-03 RX ORDER — CEFDINIR 300 MG/1
300 CAPSULE ORAL 2 TIMES DAILY
Qty: 20 CAPSULE | Refills: 0 | Status: SHIPPED | OUTPATIENT
Start: 2024-01-03 | End: 2024-01-13

## 2024-01-03 RX ORDER — DEXCHLORPHENIRAMINE MALEATE, DEXTROMETHORPHAN HBR, PHENYLEPHRINE HCL 1; 10; 5 MG/5ML; MG/5ML; MG/5ML
10 SYRUP ORAL
Qty: 240 ML | Refills: 1 | Status: SHIPPED | OUTPATIENT
Start: 2024-01-03

## 2024-01-03 RX ORDER — TRIAMCINOLONE ACETONIDE 40 MG/ML
40 INJECTION, SUSPENSION INTRA-ARTICULAR; INTRAMUSCULAR
Status: COMPLETED | OUTPATIENT
Start: 2024-01-03 | End: 2024-01-03

## 2024-01-03 RX ORDER — CELECOXIB 200 MG/1
200 CAPSULE ORAL
COMMUNITY
Start: 2023-08-24

## 2024-01-03 RX ORDER — CEFTRIAXONE 1 G/1
1 INJECTION, POWDER, FOR SOLUTION INTRAMUSCULAR; INTRAVENOUS
Status: COMPLETED | OUTPATIENT
Start: 2024-01-03 | End: 2024-01-03

## 2024-01-03 RX ORDER — FENOFIBRATE 145 MG/1
145 TABLET, COATED ORAL
COMMUNITY
Start: 2023-10-20

## 2024-01-03 RX ORDER — LOSARTAN POTASSIUM 100 MG/1
100 TABLET ORAL
COMMUNITY
Start: 2023-12-07

## 2024-01-03 RX ORDER — DIVALPROEX SODIUM 250 MG/1
1 TABLET, DELAYED RELEASE ORAL
COMMUNITY

## 2024-01-03 RX ORDER — SERTRALINE HYDROCHLORIDE 100 MG/1
100 TABLET, FILM COATED ORAL DAILY
Qty: 90 TABLET | Refills: 3 | Status: SHIPPED | OUTPATIENT
Start: 2024-01-03

## 2024-01-03 RX ORDER — CARVEDILOL 12.5 MG/1
12.5 TABLET ORAL
COMMUNITY

## 2024-01-03 RX ORDER — TRAMADOL HYDROCHLORIDE 50 MG/1
TABLET ORAL
COMMUNITY

## 2024-01-03 RX ADMIN — TRIAMCINOLONE ACETONIDE 40 MG: 40 INJECTION, SUSPENSION INTRA-ARTICULAR; INTRAMUSCULAR at 12:01

## 2024-01-03 RX ADMIN — CEFTRIAXONE 1 G: 1 INJECTION, POWDER, FOR SOLUTION INTRAMUSCULAR; INTRAVENOUS at 12:01

## 2024-01-03 NOTE — PROGRESS NOTES
Subjective     Patient ID: Earnest Gonzalez is a 63 y.o. male.    Chief Complaint: Sore Throat, Nasal Congestion, and Cough    Started taking amoxicillin at home. Has no more. Denies fever.    Sore Throat   Associated symptoms include congestion and coughing. Pertinent negatives include no diarrhea, headaches, shortness of breath or vomiting.   Cough  Associated symptoms include a sore throat. Pertinent negatives include no chest pain, fever, headaches, myalgias or shortness of breath. There is no history of environmental allergies.     Review of Systems   Constitutional:  Negative for fatigue and fever.   HENT:  Positive for nasal congestion and sore throat. Negative for dental problem.    Eyes:  Negative for discharge.   Respiratory:  Positive for cough. Negative for choking, chest tightness and shortness of breath.    Cardiovascular:  Negative for chest pain and leg swelling.   Gastrointestinal:  Negative for constipation, diarrhea, nausea and vomiting.   Genitourinary:  Negative for discharge and flank pain.   Musculoskeletal:  Negative for arthralgias and myalgias.   Allergic/Immunologic: Negative for environmental allergies.   Neurological:  Negative for headaches and memory loss.   Psychiatric/Behavioral:  Negative for behavioral problems and hallucinations.           Objective     Physical Exam  Vitals and nursing note reviewed.   Constitutional:       Appearance: Normal appearance. He is normal weight.   HENT:      Head: Normocephalic and atraumatic.      Right Ear: Tympanic membrane normal.      Left Ear: Tympanic membrane normal.      Nose: Nose normal.      Mouth/Throat:      Mouth: Mucous membranes are moist.   Eyes:      Extraocular Movements: Extraocular movements intact.      Conjunctiva/sclera: Conjunctivae normal.      Pupils: Pupils are equal, round, and reactive to light.   Cardiovascular:      Rate and Rhythm: Normal rate and regular rhythm.      Pulses: Normal pulses.   Pulmonary:      Effort:  Pulmonary effort is normal.      Breath sounds: Normal breath sounds.      Comments: Clear to auscultation after cough  Abdominal:      General: Abdomen is flat. Bowel sounds are normal.      Palpations: Abdomen is soft.   Musculoskeletal:         General: Normal range of motion.      Cervical back: Normal range of motion and neck supple.   Skin:     General: Skin is warm and dry.   Neurological:      General: No focal deficit present.      Mental Status: He is alert and oriented to person, place, and time.   Psychiatric:         Mood and Affect: Mood normal.            Assessment and Plan     1. Coronary artery disease involving native coronary artery of native heart, unspecified whether angina present    2. Arthritis    3. Upper respiratory tract infection, unspecified type  -     cefTRIAXone injection 1 g  -     triamcinolone acetonide injection 40 mg  -     cefdinir (OMNICEF) 300 MG capsule; Take 1 capsule (300 mg total) by mouth 2 (two) times daily. for 10 days  Dispense: 20 capsule; Refill: 0  -     dexchlorphen-phenylephrine-DM (POLYTUSSIN DM,DEXCHLORPHENRMN,) 1-5-10 mg/5 mL Syrp; Take 10 mLs by mouth every 6 (six) hours while awake.  Dispense: 240 mL; Refill: 1    Other orders  -     sertraline (ZOLOFT) 100 MG tablet; Take 1 tablet (100 mg total) by mouth once daily.  Dispense: 90 tablet; Refill: 3        RTC if s/sx continue         No follow-ups on file.

## 2024-01-08 ENCOUNTER — CLINICAL SUPPORT (OUTPATIENT)
Dept: REHABILITATION | Facility: HOSPITAL | Age: 64
End: 2024-01-08
Payer: COMMERCIAL

## 2024-01-08 DIAGNOSIS — R29.898 WEAKNESS OF RIGHT LOWER EXTREMITY: Primary | ICD-10-CM

## 2024-01-08 PROCEDURE — 97530 THERAPEUTIC ACTIVITIES: CPT

## 2024-01-08 NOTE — PROGRESS NOTES
"[facility]   Physical Therapy Treatment Note      Name: Earnest Gonzalez  Clinic Number: [id number]    Therapy Diagnosis:        Encounter Diagnoses   Name Primary?    S/P right knee arthroscopy      Weakness of right lower extremity Yes        Physician: Son Pike MD     Physician Orders: PT Eval and Treat   Medical Diagnosis from Referral: s/p R knee arthroscopy   Evaluation Date: 12/21/2023  Authorization Period Expiration: 12/17/2024  Plan of Care Expiration: 01/19/2024  Progress Note Due: 01/19/2024  Date of Surgery: 12/15/2023  Visit # / Visits authorized: 3/8  FOTO: to be completed on visit 4     Precautions: Standard      Time In: 14:10  Time Out: 14:40  Total Billable Time:  30 minutes   Visit Date: 1/8/2024    PTA Visit #: 0 /5   Subjective     Patient reports: "I've been walking a lot. I had the flu last week so I didn't come any."   .  He was compliant with home exercise program.  Response to previous treatment: No adverse effects noted   Functional change:    Pain: 0/10  Location: right knee      Objective      Objective Measures updated at progress report unless specified.     Treatment     Josias received the treatments listed below:    therapeutic exercises to develop strength, endurance, ROM, and flexibility.  See flow-sheet below:  *indicates increases in reps or resistance during this treatment session    Ther- Ex Reps    Bike  6 minutes *   Wedge 2 minutes     Hamstring Stretch 2 x 20"    Heelslides 20 x  (130 degrees)    LAQs  2 x 10 2 #    Hamstring Curls 2 x 10 red TB    Quad sets  3 x 10, 5" *    4 way hip 20 x each direction    Straight leg raises with ER  20 x                    therapeutic activities to improve static and dynamic functional performance.  See flow-sheet below:   Ther-Act Reps   Heel Raises 3 x 10    Mini Squats 2 x 10    3 way hip  ----    Sit <> stands  2 x 10    Forward Step ups 2 x 10 high step    Lateral Step ups 2  x 10 high step              "                         Patient Education and Home Exercises       Education provided:   - Plan of Care, Home exercise program,Delayed onset muscle soreness     Written Home Exercises Provided: Patient instructed to cont prior HEP. Exercises were reviewed and Josias was able to demonstrate them prior to the end of the session.  Josias demonstrated good  understanding of the education provided. See Electronic Medical Record under Patient Instructions for exercises provided during therapy sessions    Assessment     Earnest is a 63 y.o. male referred to outpatient Physical Therapy with a medical diagnosis of s/p R knee arthroscope- meniscectomy and debridement. Patient presents with R knee pain, decreased R LE muscle strength and decreased motor control of R LE. Patient's impairments are limiting his overall activity tolerance at this time.  PT provided patient with proper setup on rec bike to provide musculoskeletal warm-up prior to exercises. Patient required verbal cues to decrease hip hiking compensation initially on rec-bike. Patient required visual, verbal and tactile cueing throughout treatment for decreased speed, proper LE alignment, decreased compensations and increased eccentric control to increase effectiveness of exercises. Patient had no reports of adverse effects to treatment      Josias Is progressing well towards his goals.   Patient prognosis is Good.     Patient will continue to benefit from skilled outpatient physical therapy to address the deficits listed in the problem list box on initial evaluation, provide pt/family education and to maximize pt's level of independence in the home and community environment.     Patient's spiritual, cultural and educational needs considered and pt agreeable to plan of care and goals.     Anticipated barriers to physical therapy:  compliance with home exercise program and treatment, compliance with post surgical restrictions     Goals:   Short Term Goals: 2 weeks   Patient  will independently complete Home exercise program with correct form.   Patient will report R knee pain at worst of less than or equal to 5/10 for improved quality of life.      Long Term Goals: 4 weeks   Patient will report decreased R knee pain with going up and down stairs to less than or equal to 3/10 for improved quality of life.  Pt will increase R knee strength to 4+/5 to allow patient to safely return to prior level of function   Patient will ambulate community distance with improved gait mechanics.   Patient will have increased FOTO score to greater than or equal to 75% for increased function.     Plan     Plan of care Certification: 12/21/2023 to 01/19/2023.  Outpatient Physical Therapy 2 times weekly for 4 weeks to include the following interventions: Electrical Stimulation unattended, Gait Training, Manual Therapy, Patient Education, Therapeutic Activities, and Therapeutic Exercise.     Continue Plan of Care Per PT order to progress patient toward rehab goals as tolerated by patient.   Alessandro Jessica, PT, DPT     1/8/2024

## 2024-01-12 ENCOUNTER — CLINICAL SUPPORT (OUTPATIENT)
Dept: REHABILITATION | Facility: HOSPITAL | Age: 64
End: 2024-01-12
Payer: COMMERCIAL

## 2024-01-12 DIAGNOSIS — R29.898 WEAKNESS OF RIGHT LOWER EXTREMITY: Primary | ICD-10-CM

## 2024-01-12 PROCEDURE — 97110 THERAPEUTIC EXERCISES: CPT | Mod: CQ

## 2024-01-12 PROCEDURE — 97530 THERAPEUTIC ACTIVITIES: CPT | Mod: CQ

## 2024-01-12 NOTE — PROGRESS NOTES
"[facility]   Physical Therapy Treatment Note      Name: Earnest Gonzalez  Clinic Number: [id number]    Therapy Diagnosis:        Encounter Diagnoses   Name Primary?    S/P right knee arthroscopy      Weakness of right lower extremity Yes        Physician: Son Pike MD     Physician Orders: PT Eval and Treat   Medical Diagnosis from Referral: s/p R knee arthroscopy   Evaluation Date: 12/21/2023  Authorization Period Expiration: 12/17/2024  Plan of Care Expiration: 01/19/2024  Progress Note Due: 01/19/2024  Date of Surgery: 12/15/2023  Visit # / Visits authorized: 4/8  FOTO: to be completed on visit 4     Precautions: Standard      Time In: 13:14  Time Out: 13:52  Total Billable Time:  38 minutes   Visit Date: 1/12/2024    PTA Visit #: 1/5   Subjective     Patient reports: "I fell today but I didn't land on my bad knee."   .  He was compliant with home exercise program.  Response to previous treatment: No adverse effects noted   Functional change:    Pain: 0/10  Location: right knee      Objective      Objective Measures updated at progress report unless specified.     Treatment     Josias received the treatments listed below:    therapeutic exercises to develop strength, endurance, ROM, and flexibility.  See flow-sheet below: 23 minutes   *indicates increases in reps or resistance during this treatment session    Ther- Ex Reps    Bike  6 minutes    Wedge 2 minutes     Hamstring Stretch 2 x 20"    Heelslides 20 x  (130 degrees)    LAQs  2 x 10 2 #    Hamstring Curls 2 x 10 red TB    Quad sets  3 x 10, 5"    4 way hip 20 x each direction    Straight leg raises with ER  20 x                    therapeutic activities to improve static and dynamic functional performance.  See flow-sheet below: 15 minutes   Ther-Act Reps   Heel Raises 3 x 10    Mini Squats 2 x 10    3 way hip  ----    Sit <> stands  2 x 10    Forward Step ups 2 x 10 high step    Lateral Step ups 2  x 10 high step                   "                    Patient Education and Home Exercises       Education provided:   - Plan of Care, Home exercise program,Delayed onset muscle soreness     Written Home Exercises Provided: Patient instructed to cont prior HEP. Exercises were reviewed and Josias was able to demonstrate them prior to the end of the session.  Josias demonstrated good  understanding of the education provided. See Electronic Medical Record under Patient Instructions for exercises provided during therapy sessions    Assessment     Earnest is a 63 y.o. male referred to outpatient Physical Therapy with a medical diagnosis of s/p R knee arthroscope- meniscectomy and debridement. Patient presents with R knee pain, decreased R LE muscle strength and decreased motor control of R LE. Patient's impairments are limiting his overall activity tolerance at this time.  LPTA provided pt with proper setup on rec bike to provide musculoskeletal warm-up prior to exercises. Patient required verbal cues to decrease hip hiking compensation initially on rec-bike. Pt continues to require visual, verbal and tactile cueing throughout treatment for decreased speed, proper LE alignment, decreased compensations, movement through full range of motion and increased eccentric control to increase effectiveness of exercises. Pt had no reports of adverse effects from tx.    Josias Is progressing well towards his goals.   Patient prognosis is Good.     Patient will continue to benefit from skilled outpatient physical therapy to address the deficits listed in the problem list box on initial evaluation, provide pt/family education and to maximize pt's level of independence in the home and community environment.     Patient's spiritual, cultural and educational needs considered and pt agreeable to plan of care and goals.     Anticipated barriers to physical therapy:  compliance with home exercise program and treatment, compliance with post surgical restrictions     Goals:   Short Term  Goals: 2 weeks   Patient will independently complete Home exercise program with correct form.   Patient will report R knee pain at worst of less than or equal to 5/10 for improved quality of life.      Long Term Goals: 4 weeks   Patient will report decreased R knee pain with going up and down stairs to less than or equal to 3/10 for improved quality of life.  Pt will increase R knee strength to 4+/5 to allow patient to safely return to prior level of function   Patient will ambulate community distance with improved gait mechanics.   Patient will have increased FOTO score to greater than or equal to 75% for increased function.     Plan     Plan of care Certification: 12/21/2023 to 01/19/2023.  Outpatient Physical Therapy 2 times weekly for 4 weeks to include the following interventions: Electrical Stimulation unattended, Gait Training, Manual Therapy, Patient Education, Therapeutic Activities, and Therapeutic Exercise.     Continue Plan of Care Per PT order to progress patient toward rehab goals as tolerated by patient.   FERNANDA Juárez  1/12/2024

## 2024-01-17 NOTE — PLAN OF CARE
[facility]  Physical Therapy Plan of Care Note     Name: Earnest Gonzalez  Clinic Number: [id number]    Therapy Diagnosis:   Encounter Diagnosis   Name Primary?    Weakness of right lower extremity Yes     Physician: Son Pike MD    Visit Date: 1/12/2024    Physician Orders: PT Eval and Treat   Medical Diagnosis from Referral: s/p R knee arthroscopy   Evaluation Date: 12/21/2023  Authorization Period Expiration: 12/17/2024  Plan of Care Expiration: 02/16/2024  Progress Note Due: 02/16/2024  Date of Surgery: 12/15/2023  Visit # / Visits authorized: 4/8  FOTO: to be completed on visit 8  Precautions: Standard  Functional Level Prior to Evaluation:  Independent     Subjective     Update: Patient missed his scheduled PT visit today and when PT called to check on patient he stated that he had forgotten about his therapy visit today. He was unable to reschedule this week before end of plan of care. PT and patient agree to recert PT at this time.     Objective      Update: Patient demonstrates increasing R LE muscle strength and motor control resulting in improved functional mobility and activity tolerance.     Assessment   Update: Patient's progress with therapy has been limited due to patient missing multiple scheduled visits due to illness and forgetting about his appointments.     Previous Short Term Goals Status:   Short Term Goals: 2 weeks   Patient will independently complete Home exercise program with correct form. -MET   Patient will report R knee pain at worst of less than or equal to 5/10 for improved quality of life. -Met     Long Term Goal Status: continue per initial plan of care.  Reasons for Recertification of Therapy:   Continue progressing toward rehab goals.     GOALS  Patient will report decreased R knee pain with going up and down stairs to less than or equal to 3/10 for improved quality of life.-Goal in progress  Pt will increase R knee strength to 4+/5 to allow patient to  safely return to prior level of function -Goal in progress  Patient will ambulate community distance with improved gait mechanics. -Goal in progress   Patient will have increased FOTO score to greater than or equal to 75% for increased function. -Goal in progress    Plan     Updated Certification Period: 1/17/2024   to 2/16/2024   Recommended Treatment Plan: 2 times per week for 4 weeks:  Electrical Stimulation unattended, Gait Training, Manual Therapy, Moist Heat/ Ice, Neuromuscular Re-ed, Patient Education, Therapeutic Activities, and Therapeutic Exercise    Alessandro Jessica, PT, DPT

## 2024-01-25 ENCOUNTER — OFFICE VISIT (OUTPATIENT)
Dept: ORTHOPEDICS | Facility: CLINIC | Age: 64
End: 2024-01-25
Payer: COMMERCIAL

## 2024-01-25 ENCOUNTER — CLINICAL SUPPORT (OUTPATIENT)
Dept: REHABILITATION | Facility: HOSPITAL | Age: 64
End: 2024-01-25
Payer: COMMERCIAL

## 2024-01-25 DIAGNOSIS — R29.898 WEAKNESS OF RIGHT LOWER EXTREMITY: Primary | ICD-10-CM

## 2024-01-25 DIAGNOSIS — M17.11 PRIMARY OSTEOARTHRITIS OF RIGHT KNEE: Primary | ICD-10-CM

## 2024-01-25 DIAGNOSIS — Z98.890 STATUS POST ARTHROSCOPY OF RIGHT KNEE: ICD-10-CM

## 2024-01-25 PROCEDURE — 1160F RVW MEDS BY RX/DR IN RCRD: CPT | Mod: ,,, | Performed by: ORTHOPAEDIC SURGERY

## 2024-01-25 PROCEDURE — 99213 OFFICE O/P EST LOW 20 MIN: CPT | Mod: PBBFAC | Performed by: ORTHOPAEDIC SURGERY

## 2024-01-25 PROCEDURE — 99024 POSTOP FOLLOW-UP VISIT: CPT | Mod: ,,, | Performed by: ORTHOPAEDIC SURGERY

## 2024-01-25 PROCEDURE — 4010F ACE/ARB THERAPY RXD/TAKEN: CPT | Mod: ,,, | Performed by: ORTHOPAEDIC SURGERY

## 2024-01-25 PROCEDURE — 1159F MED LIST DOCD IN RCRD: CPT | Mod: ,,, | Performed by: ORTHOPAEDIC SURGERY

## 2024-01-25 PROCEDURE — 97530 THERAPEUTIC ACTIVITIES: CPT | Mod: CQ

## 2024-01-25 PROCEDURE — 97110 THERAPEUTIC EXERCISES: CPT | Mod: CQ

## 2024-01-25 NOTE — PROGRESS NOTES
HISTORY OF PRESENT ILLNESS:       Arthroscopy, Knee, With Chondroplasty - Right, Arthroscopy, Knee, With Meniscectomy - Right, and Removal, Loose Body, Joint, Arthroscopic - Right 12/15/2023      Pt is here today for Second post-operative followup of his knee arthroscopy.      he is doing well.  He does reports a pull behind his knee that occurred over the last week.   He is still doing PT at [facility].     We have reviewed his findings and discussed plan of care and future treatment options, including the physical therapy plan.                                                                                     PHYSICAL EXAMINATION:     Incision sites healed well  No evidence of any erythema, infection or induration  Range of motion 0-120 degrees  Minimal effusion  2+ DP pulse  No swelling, no calf tenderness  - Emilee's sign  Negative medial joint line tendernes  Moderate quad atrophy                                                                                 ASSESSMENT:                                                                                                                                               1. Status post above, doing well.                                                                                                                               PLAN:                                                                                                                                                     1. Continue with PT  Does have existing OA in his knee.  Will plan for Rt knee visco injection in 4 weeks  2. Emphasized quad function.  3. I have discussed return to activity in detail.  4. he will see us back in 4 weeks.                                      5. All questions were answered and he should contact us if he  has any questions or concerns in the interim.              There are no Patient Instructions on file for this visit.

## 2024-01-25 NOTE — PROGRESS NOTES
"[facility]   Physical Therapy Treatment Note      Name: Earnest Gonzalez  Clinic Number: [id number]    Therapy Diagnosis:        Encounter Diagnoses   Name Primary?    S/P right knee arthroscopy      Weakness of right lower extremity Yes        Physician: Son Pike MD     Physician Orders: PT Eval and Treat   Medical Diagnosis from Referral: s/p R knee arthroscopy   Evaluation Date: 12/21/2023  Authorization Period Expiration: 12/17/2024  Plan of Care Expiration: 01/19/2024  Progress Note Due: 01/19/2024  Date of Surgery: 12/15/2023  Visit # / Visits authorized: 5/8  FOTO: to be completed on visit 4     Precautions: Standard      Time In: 13:55  Time Out: 14:30  Total Billable Time:  35 minutes   Visit Date: 1/25/2024    PTA Visit #: 2/5  Subjective     Patient reports: "My knee hurts some but I'm up on it a good bit".   .  He was compliant with home exercise program.  Response to previous treatment: No adverse effects noted   Functional change:    Pain: 0/10  Location: right knee      Objective      Objective Measures updated at progress report unless specified.     Treatment     Josias received the treatments listed below:    therapeutic exercises to develop strength, endurance, ROM, and flexibility.  See flow-sheet below: 23 minutes   *indicates increases in reps or resistance during this treatment session    Ther- Ex Reps    Bike  6 minutes    Wedge 2 minutes     Hamstring Stretch 2 x 20"    Heelslides 20 x  (130 degrees)    LAQs  2 x 10 2 #    Hamstring Curls 2 x 10 red TB    Quad sets  3 x 10, 5" (not completed)    4 way hip 20 x each direction (not completed)    Straight leg raises with ER  20 x (not completed)                    therapeutic activities to improve static and dynamic functional performance.  See flow-sheet below:     Ther-Act Reps   Heel Raises 3 x 10    Mini Squats 2 x 10    3 way hip  ----    Sit <> stands  2 x 10    Forward Step ups 2 x 10 high step    Lateral Step " ups 2  x 10 high step                                      Patient Education and Home Exercises       Education provided:   - Plan of Care, Home exercise program,Delayed onset muscle soreness     Written Home Exercises Provided: Patient instructed to cont prior HEP. Exercises were reviewed and Josias was able to demonstrate them prior to the end of the session.  Josias demonstrated good  understanding of the education provided. See Electronic Medical Record under Patient Instructions for exercises provided during therapy sessions    Assessment     Earnest is a 63 y.o. male referred to outpatient Physical Therapy with a medical diagnosis of s/p R knee arthroscope- meniscectomy and debridement. Patient presents with R knee pain, decreased R LE muscle strength and decreased motor control of R LE. Patient's impairments are limiting his overall activity tolerance at this time.  LPTA provided pt with proper setup on rec bike to provide musculoskeletal warm-up prior to exercises. Patient required verbal cues to decrease hip hiking compensation initially on rec-bike. Patient demonstrates min antalgic gait with ambulation.  Patient able to progress through completion of Therapeutic activities with only min cues required to decrease speed of movement. Therapeutic exercises not completed secondary to patient needed to leave early to get in car line at school.   No adverse effects noted.     Josias Is progressing well towards his goals.   Patient prognosis is Good.     Patient will continue to benefit from skilled outpatient physical therapy to address the deficits listed in the problem list box on initial evaluation, provide pt/family education and to maximize pt's level of independence in the home and community environment.     Patient's spiritual, cultural and educational needs considered and pt agreeable to plan of care and goals.     Anticipated barriers to physical therapy:  compliance with home exercise program and treatment,  compliance with post surgical restrictions     Goals:   Short Term Goals: 2 weeks   Patient will independently complete Home exercise program with correct form.   Patient will report R knee pain at worst of less than or equal to 5/10 for improved quality of life.      Long Term Goals: 4 weeks   Patient will report decreased R knee pain with going up and down stairs to less than or equal to 3/10 for improved quality of life.  Pt will increase R knee strength to 4+/5 to allow patient to safely return to prior level of function   Patient will ambulate community distance with improved gait mechanics.   Patient will have increased FOTO score to greater than or equal to 75% for increased function.     Plan     Plan of care Certification: 12/21/2023 to 01/19/2023.  Outpatient Physical Therapy 2 times weekly for 4 weeks to include the following interventions: Electrical Stimulation unattended, Gait Training, Manual Therapy, Patient Education, Therapeutic Activities, and Therapeutic Exercise.     Continue Plan of Care Per PT order to progress patient toward rehab goals as tolerated by patient.   FERNANDA Mathur   1/25/2024

## 2024-01-30 ENCOUNTER — CLINICAL SUPPORT (OUTPATIENT)
Dept: REHABILITATION | Facility: HOSPITAL | Age: 64
End: 2024-01-30
Payer: COMMERCIAL

## 2024-01-30 DIAGNOSIS — R29.898 WEAKNESS OF RIGHT LOWER EXTREMITY: Primary | ICD-10-CM

## 2024-01-30 PROCEDURE — 97110 THERAPEUTIC EXERCISES: CPT | Mod: CQ

## 2024-01-30 PROCEDURE — 97530 THERAPEUTIC ACTIVITIES: CPT | Mod: CQ

## 2024-01-30 NOTE — PROGRESS NOTES
"[facility]   Physical Therapy Treatment Note      Name: Earnest Gonzalez  Clinic Number: [id number]    Therapy Diagnosis:        Encounter Diagnoses   Name Primary?    S/P right knee arthroscopy      Weakness of right lower extremity Yes        Physician: Son Pike MD     Physician Orders: PT Eval and Treat   Medical Diagnosis from Referral: s/p R knee arthroscopy   Evaluation Date: 12/21/2023  Authorization Period Expiration: 12/17/2024  Plan of Care Expiration: 01/19/2024  Progress Note Due: 01/19/2024  Date of Surgery: 12/15/2023  Visit # / Visits authorized: 6/8  FOTO: to be completed on visit 4     Precautions: Standard      Time In: 13:20  Time Out: 14:00  Total Billable Time:  40 minutes   Visit Date: 1/30/2024    PTA Visit #: 3/5  Subjective     Patient reports:  No new complaints.   .  He was compliant with home exercise program.  Response to previous treatment: No adverse effects noted   Functional change:    Pain: 0/10  Location: right knee      Objective      Objective Measures updated at progress report unless specified.     Treatment     Josias received the treatments listed below:    therapeutic exercises to develop strength, endurance, ROM, and flexibility.  See flow-sheet below: 25 minutes   *indicates increases in reps or resistance during this treatment session    Ther- Ex Reps    Bike  8 minutes    Wedge 2 minutes     Hamstring Stretch 2 x 20"    Heelslides 20 x  (130 degrees)    LAQs  2 x 10 2 #    Hamstring Curls 2 x 10 red TB    Quad sets  3 x 10, 5" (not completed)    4 way hip 20 x each direction (not completed)    Straight leg raises with ER  20 x (not completed)     Side lying hip abduction   2 x 10, side lying              therapeutic activities to improve static and dynamic functional performance for 15 minutes  See flow-sheet below:     Ther-Act Reps   Heel Raises 3 x 10    Mini Squats 2 x 10    3 way hip  ----    Sit <> stands  2 x 10    Forward Step ups 2 x " 10 high step    Lateral Step ups 2  x 10 high step                                      Patient Education and Home Exercises       Education provided:   - Plan of Care, Home exercise program,Delayed onset muscle soreness     Written Home Exercises Provided: Patient instructed to cont prior HEP. Exercises were reviewed and Josias was able to demonstrate them prior to the end of the session.  Josias demonstrated good  understanding of the education provided. See Electronic Medical Record under Patient Instructions for exercises provided during therapy sessions    Assessment     Earnest is a 63 y.o. male referred to outpatient Physical Therapy with a medical diagnosis of s/p R knee arthroscope- meniscectomy and debridement. Patient presents with R knee pain, decreased R LE muscle strength and decreased motor control of R LE. Patient's impairments are limiting his overall activity tolerance at this time.  LPTA provided pt with proper setup on rec bike to provide musculoskeletal warm-up prior to exercises.  Patient able to progress through completion of Therapeutic exercises and Therapeutic activities without reports of increased pain, and demonstrates good carryover completing with proper form.  Active range of motion Right knee extension to flexion goals met.  No adverse effects noted during treatment session.     Josias Is progressing well towards his goals.   Patient prognosis is Good.     Patient will continue to benefit from skilled outpatient physical therapy to address the deficits listed in the problem list box on initial evaluation, provide pt/family education and to maximize pt's level of independence in the home and community environment.     Patient's spiritual, cultural and educational needs considered and pt agreeable to plan of care and goals.     Anticipated barriers to physical therapy:  compliance with home exercise program and treatment, compliance with post surgical restrictions     Goals:   Short Term Goals:  2 weeks   Patient will independently complete Home exercise program with correct form.   Patient will report R knee pain at worst of less than or equal to 5/10 for improved quality of life.      Long Term Goals: 4 weeks   Patient will report decreased R knee pain with going up and down stairs to less than or equal to 3/10 for improved quality of life.  Pt will increase R knee strength to 4+/5 to allow patient to safely return to prior level of function   Patient will ambulate community distance with improved gait mechanics.   Patient will have increased FOTO score to greater than or equal to 75% for increased function.     Plan     Plan of care Certification: 12/21/2023 to 01/19/2023.  Outpatient Physical Therapy 2 times weekly for 4 weeks to include the following interventions: Electrical Stimulation unattended, Gait Training, Manual Therapy, Patient Education, Therapeutic Activities, and Therapeutic Exercise.     Continue Plan of Care Per PT order to progress patient toward rehab goals as tolerated by patient.   FERNANDA Mathur   1/30/2024

## 2024-02-22 ENCOUNTER — OFFICE VISIT (OUTPATIENT)
Dept: ORTHOPEDICS | Facility: CLINIC | Age: 64
End: 2024-02-22
Payer: COMMERCIAL

## 2024-02-22 DIAGNOSIS — Z98.890 STATUS POST ARTHROSCOPY OF RIGHT KNEE: Primary | ICD-10-CM

## 2024-02-22 PROCEDURE — 4010F ACE/ARB THERAPY RXD/TAKEN: CPT | Mod: ,,, | Performed by: NURSE PRACTITIONER

## 2024-02-22 PROCEDURE — 99211 OFF/OP EST MAY X REQ PHY/QHP: CPT | Mod: PBBFAC | Performed by: NURSE PRACTITIONER

## 2024-02-22 PROCEDURE — 99999PBSHW PR PBB SHADOW TECHNICAL ONLY FILED TO HB: Mod: JZ,PBBFAC,,

## 2024-02-22 PROCEDURE — 99024 POSTOP FOLLOW-UP VISIT: CPT | Mod: ,,, | Performed by: NURSE PRACTITIONER

## 2024-02-22 PROCEDURE — 20610 DRAIN/INJ JOINT/BURSA W/O US: CPT | Mod: PBBFAC | Performed by: NURSE PRACTITIONER

## 2024-02-22 RX ADMIN — Medication 48 MG: at 09:02

## 2024-02-22 NOTE — PROGRESS NOTES
64 y.o. Male returns to clinic for a follow up visit regarding     ICD-10-CM ICD-9-CM   1. Status post arthroscopy of right knee  Z98.890 V45.89        Patient is 2 1/2 months post op right knee arthroscopy. He is here today and approved for synvisc one injection.        Past Medical History:   Diagnosis Date    GERD (gastroesophageal reflux disease)     H/O colonoscopy with polypectomy 01/30/2023    Hyperlipidemia     Hypertension      Past Surgical History:   Procedure Laterality Date    ARTHROSCOPIC CHONDROPLASTY OF KNEE JOINT Right 12/15/2023    Procedure: ARTHROSCOPY, KNEE, WITH CHONDROPLASTY;  Surgeon: Son Pike MD;  Location: [facility] OR;  Service: Orthopedics;  Laterality: Right;    ARTHROSCOPIC REMOVAL OF LOOSE BODY FROM JOINT Right 12/15/2023    Procedure: REMOVAL, LOOSE BODY, JOINT, ARTHROSCOPIC;  Surgeon: Son Pike MD;  Location: [facility] OR;  Service: Orthopedics;  Laterality: Right;    KNEE ARTHROSCOPY W/ MENISCECTOMY Right 12/15/2023    Procedure: ARTHROSCOPY, KNEE, WITH MENISCECTOMY;  Surgeon: Son Pike MD;  Location: [facility] OR;  Service: Orthopedics;  Laterality: Right;         PHYSICAL EXAMINATION:    General    Constitutional: He is oriented to person, place, and time. He appears well-developed and well-nourished.   HENT:   Head: Normocephalic and atraumatic.   Eyes: Pupils are equal, round, and reactive to light.   Neck: Neck supple.   Cardiovascular:  Normal rate, regular rhythm and intact distal pulses.            Pulmonary/Chest: Effort normal. No respiratory distress. He exhibits no tenderness.   Abdominal: Soft. There is no abdominal tenderness. There is no guarding.   Neurological: He is alert and oriented to person, place, and time. He has normal reflexes.   Psychiatric: He has a normal mood and affect. His behavior is normal. Judgment and thought content normal.           Right Knee Exam     Inspection   Erythema: absent  Swelling: absent  Effusion:  absent  Deformity: absent  Bruising: absent    Tenderness   The patient is experiencing no tenderness.     Range of Motion   Extension:  normal   Flexion:  normal     Tests   Meniscus   Dread:  Medial - negative Lateral - negative  Ligament Examination   Lachman: normal (-1 to 2mm)   Patella   Patellar apprehension: negative  Patellar Tracking: normal  Q-Angle at 90 degrees: normal    Other   Meniscal Cyst: absent  Sensation: normal    Left Knee Exam     Inspection   Swelling: absent  Bruising: absent    Muscle Strength   Right Lower Extremity   Quadriceps:  5/5   Hamstrin/5     Vascular Exam     Right Pulses  Dorsalis Pedis:      2+  Posterior Tibial:      2+          IMAGING:  No results found.     ASSESSMENT:      ICD-10-CM ICD-9-CM   1. Status post arthroscopy of right knee  Z98.890 V45.89       PLAN:     -Findings and treatment options were discussed with the patient  -All questions answered      Right knee synvisc one injection today  RTC 6 weeks with Dr Pike    There are no Patient Instructions on file for this visit.      Orders Placed This Encounter   Procedures    Large Joint Aspiration/Injection: R supra patellar bursa         Large Joint Aspiration/Injection: R supra patellar bursa    Date/Time: 2024 9:20 AM    Performed by: Kaylin Perrin FNP  Authorized by: Kaylin Perrin FNP    Consent Done?:  Yes (Verbal)  Indications:  Pain  Site marked: the procedure site was marked    Local anesthetic:  Bupivacaine 0.25% without epinephrine    Details:  Needle Size:  22 G  Location:  Knee  Site:  R supra patellar bursa  Medications:  48 mg hylan g-f 20 48 mg/6 mL  Patient tolerance:  Patient tolerated the procedure well with no immediate complications

## 2024-02-23 ENCOUNTER — DOCUMENTATION ONLY (OUTPATIENT)
Dept: REHABILITATION | Facility: HOSPITAL | Age: 64
End: 2024-02-23
Payer: COMMERCIAL

## 2024-02-23 NOTE — PROGRESS NOTES
[facility]  PT Discharge Note    Name: Earnest Gonzalez  Clinic Number: [id number]    Therapy Diagnosis:           Encounter Diagnoses   Name Primary?    S/P right knee arthroscopy      Weakness of right lower extremity Yes        Physician: Son Pike MD     Physician Orders: PT Eval and Treat   Medical Diagnosis from Referral: s/p R knee arthroscopy   Evaluation Date: 12/21/2023  Date of Last visit: 1/30/2024  Total Visits Received: 6    ASSESSMENT    Patient had no reports of R knee pain and had improved ROM and strength at his last visit. Patient never returned to PT for formal goal assessment.     Discharge reason: Patient has not attended therapy since 1/30/2024    Discharge FOTO Score: Not completed due to never returning to PT     Goals: PT was unable to assess patient goal status due to him not returning to PT.     PLAN   This patient is discharged from Physical Therapy      Alessandro Jessica, PT, DPT

## 2024-04-23 ENCOUNTER — OFFICE VISIT (OUTPATIENT)
Dept: ORTHOPEDICS | Facility: CLINIC | Age: 64
End: 2024-04-23
Payer: COMMERCIAL

## 2024-04-23 DIAGNOSIS — Z98.890 STATUS POST ARTHROSCOPY OF RIGHT KNEE: Primary | ICD-10-CM

## 2024-04-23 PROCEDURE — 1159F MED LIST DOCD IN RCRD: CPT | Mod: ,,, | Performed by: ORTHOPAEDIC SURGERY

## 2024-04-23 PROCEDURE — 99213 OFFICE O/P EST LOW 20 MIN: CPT | Mod: S$PBB,,, | Performed by: ORTHOPAEDIC SURGERY

## 2024-04-23 PROCEDURE — 4010F ACE/ARB THERAPY RXD/TAKEN: CPT | Mod: ,,, | Performed by: ORTHOPAEDIC SURGERY

## 2024-04-23 PROCEDURE — 99213 OFFICE O/P EST LOW 20 MIN: CPT | Mod: PBBFAC | Performed by: ORTHOPAEDIC SURGERY

## 2024-04-23 NOTE — PROGRESS NOTES
64 y.o. Male returns to clinic for a follow up visit regarding     ICD-10-CM ICD-9-CM   1. Status post arthroscopy of right knee  Z98.890 V45.89        Patient is 4 months post-op.   He is doing well. He states his knee is doing good overall.   SANE: 80    Pre op sane: 40  He does reports some left knee pain today.        Past Medical History:   Diagnosis Date    GERD (gastroesophageal reflux disease)     H/O colonoscopy with polypectomy 01/30/2023    Hyperlipidemia     Hypertension      Past Surgical History:   Procedure Laterality Date    ARTHROSCOPIC CHONDROPLASTY OF KNEE JOINT Right 12/15/2023    Procedure: ARTHROSCOPY, KNEE, WITH CHONDROPLASTY;  Surgeon: Son Pike MD;  Location: [facility] OR;  Service: Orthopedics;  Laterality: Right;    ARTHROSCOPIC REMOVAL OF LOOSE BODY FROM JOINT Right 12/15/2023    Procedure: REMOVAL, LOOSE BODY, JOINT, ARTHROSCOPIC;  Surgeon: Son Pike MD;  Location: [facility] OR;  Service: Orthopedics;  Laterality: Right;    KNEE ARTHROSCOPY W/ MENISCECTOMY Right 12/15/2023    Procedure: ARTHROSCOPY, KNEE, WITH MENISCECTOMY;  Surgeon: Son Pike MD;  Location: [facility] OR;  Service: Orthopedics;  Laterality: Right;         PHYSICAL EXAMINATION:    General    Constitutional: He is oriented to person, place, and time. He appears well-developed and well-nourished.   HENT:   Head: Normocephalic and atraumatic.   Eyes: EOM are normal. Pupils are equal, round, and reactive to light.   Neck: Neck supple.   Cardiovascular:  Normal rate and intact distal pulses.            Pulmonary/Chest: Effort normal and breath sounds normal.   Neurological: He is alert and oriented to person, place, and time. He has normal reflexes. No cranial nerve deficit. He exhibits normal muscle tone. Coordination normal.   Psychiatric: He has a normal mood and affect. His behavior is normal. Judgment normal.           Right Knee Exam     Tenderness   The patient is tender to palpation of  the lateral retinaculum and condyle.    Range of Motion   Extension:  normal   Flexion:  normal     Tests   Meniscus   Dread:  Medial - negative   Ligament Examination   Lachman: normal (-1 to 2mm)   Pivot Shift: normal (Equal)    Left Knee Exam     Inspection   Scars: absent    Muscle Strength   Right Lower Extremity   Hip Abduction: 5/5   Quadriceps:  5/5   Hamstrin/5         IMAGING:  No results found.     ASSESSMENT:      ICD-10-CM ICD-9-CM   1. Status post arthroscopy of right knee  Z98.890 V45.89       PLAN:     -Findings and treatment options were discussed with the patient  -All questions answered      Doing well.  Left knee is hurting some too.  Emphasized quad strength and stretching there.     There are no Patient Instructions on file for this visit.      No orders of the defined types were placed in this encounter.        Procedures

## 2024-05-15 ENCOUNTER — OFFICE VISIT (OUTPATIENT)
Dept: OTOLARYNGOLOGY | Facility: CLINIC | Age: 64
End: 2024-05-15
Payer: COMMERCIAL

## 2024-05-15 VITALS — WEIGHT: 200 LBS | HEIGHT: 73 IN | BODY MASS INDEX: 26.51 KG/M2

## 2024-05-15 DIAGNOSIS — H93.19 TINNITUS, UNSPECIFIED LATERALITY: ICD-10-CM

## 2024-05-15 DIAGNOSIS — H69.93 DYSFUNCTION OF BOTH EUSTACHIAN TUBES: Primary | ICD-10-CM

## 2024-05-15 DIAGNOSIS — H65.22 LEFT CHRONIC SEROUS OTITIS MEDIA: ICD-10-CM

## 2024-05-15 PROCEDURE — 1160F RVW MEDS BY RX/DR IN RCRD: CPT | Mod: ,,, | Performed by: OTOLARYNGOLOGY

## 2024-05-15 PROCEDURE — 99204 OFFICE O/P NEW MOD 45 MIN: CPT | Mod: S$PBB,,, | Performed by: OTOLARYNGOLOGY

## 2024-05-15 PROCEDURE — 3008F BODY MASS INDEX DOCD: CPT | Mod: ,,, | Performed by: OTOLARYNGOLOGY

## 2024-05-15 PROCEDURE — 4010F ACE/ARB THERAPY RXD/TAKEN: CPT | Mod: ,,, | Performed by: OTOLARYNGOLOGY

## 2024-05-15 PROCEDURE — 1159F MED LIST DOCD IN RCRD: CPT | Mod: ,,, | Performed by: OTOLARYNGOLOGY

## 2024-05-15 PROCEDURE — 99214 OFFICE O/P EST MOD 30 MIN: CPT | Mod: PBBFAC | Performed by: OTOLARYNGOLOGY

## 2024-05-15 RX ORDER — FLUTICASONE PROPIONATE 50 MCG
2 SPRAY, SUSPENSION (ML) NASAL DAILY
Qty: 16 G | Refills: 2 | Status: SHIPPED | OUTPATIENT
Start: 2024-05-15

## 2024-05-15 RX ORDER — AMOXICILLIN 500 MG/1
500 CAPSULE ORAL 2 TIMES DAILY
Qty: 28 CAPSULE | Refills: 0 | Status: SHIPPED | OUTPATIENT
Start: 2024-05-15

## 2024-05-15 NOTE — PROGRESS NOTES
Subjective:       Patient ID: Earnest Gonzalez is a 64 y.o. male.    Chief Complaint: Tinnitus (Patient complains of having buzzing and pressure in both ears.)  Left ear mostly decreased hearing and vertigo   Ringing in Ears:    Associated symptoms: Dizziness, ear pain and tinnitus.     PMH includes: Dizziness.      Review of Systems   HENT:  Positive for ear pain, hearing loss and tinnitus.    Neurological:  Positive for dizziness.   All other systems reviewed and are negative.      Objective:      Physical Exam  General: NAD  Head: Normocephalic, atraumatic, no facial asymmetry/normal strength,  Ears: Both auricules normal in appearance, w/o deformities tympanic membranes Fluid left TYMPANOGRAM flat  external auditory canals normal  Nose: External nose w/o deformities normal turbinates no drainage or inflammation  Oral Cavity: Lips, gums, floor of mouth, tongue hard palate, and buccal mucosa without mass/lesion  Oropharynx: Mucosa pink and moist, soft palate, posterior pharynx and oropharyngeal wall without mass/lesion  Neck: Supple, symmetric, trachea midline, no palpable mass/lesion, no palpable cervical lymphadenopathy  Skin: Warm and dry, no concerning lesions  Respiratory: Respirations even, unlabored  Assessment:       1. Dysfunction of both eustachian tubes    2. Left chronic serous otitis media    3. Tinnitus, unspecified laterality        Plan:       Flonase amoxil   May need tube since going on for over 6 weeks     F/u 2 weeks

## 2024-05-30 ENCOUNTER — OFFICE VISIT (OUTPATIENT)
Dept: OTOLARYNGOLOGY | Facility: CLINIC | Age: 64
End: 2024-05-30
Payer: COMMERCIAL

## 2024-05-30 VITALS — HEIGHT: 73 IN | BODY MASS INDEX: 26.77 KG/M2 | WEIGHT: 202 LBS

## 2024-05-30 DIAGNOSIS — H93.19 TINNITUS, UNSPECIFIED LATERALITY: ICD-10-CM

## 2024-05-30 DIAGNOSIS — H69.93 DYSFUNCTION OF BOTH EUSTACHIAN TUBES: Primary | ICD-10-CM

## 2024-05-30 DIAGNOSIS — H65.22 LEFT CHRONIC SEROUS OTITIS MEDIA: ICD-10-CM

## 2024-05-30 PROCEDURE — 99214 OFFICE O/P EST MOD 30 MIN: CPT | Mod: S$PBB,,, | Performed by: OTOLARYNGOLOGY

## 2024-05-30 PROCEDURE — 99214 OFFICE O/P EST MOD 30 MIN: CPT | Mod: PBBFAC | Performed by: OTOLARYNGOLOGY

## 2024-05-30 PROCEDURE — 1159F MED LIST DOCD IN RCRD: CPT | Mod: ,,, | Performed by: OTOLARYNGOLOGY

## 2024-05-30 PROCEDURE — 3008F BODY MASS INDEX DOCD: CPT | Mod: ,,, | Performed by: OTOLARYNGOLOGY

## 2024-05-30 PROCEDURE — 4010F ACE/ARB THERAPY RXD/TAKEN: CPT | Mod: ,,, | Performed by: OTOLARYNGOLOGY

## 2024-05-30 RX ORDER — CIPROFLOXACIN 500 MG/1
500 TABLET ORAL 2 TIMES DAILY
Qty: 20 TABLET | Refills: 0 | Status: SHIPPED | OUTPATIENT
Start: 2024-05-30

## 2024-05-30 NOTE — PROGRESS NOTES
Subjective:       Patient ID: Earnest Gonzalez is a 64 y.o. male.    Chief Complaint: Otitis Media (Left ear. Pt states his left ear got better for one day, then tinnitus started again, completed po abx and continues to use flonase daily. )    Otitis Media:    Associated symptoms: Ear pain.      Review of Systems   HENT:  Positive for ear pain, hearing loss and tinnitus.    All other systems reviewed and are negative.      Objective:      Physical Exam  General: NAD  Head: Normocephalic, atraumatic, no facial asymmetry/normal strength,  Ears: Both auricules normal in appearance, w/o deformities tympanic membranes fluid left external auditory canals normal  Nose: External nose w/o deformities normal turbinates no drainage or inflammation  Oral Cavity: Lips, gums, floor of mouth, tongue hard palate, and buccal mucosa without mass/lesion  Oropharynx: Mucosa pink and moist, soft palate, posterior pharynx and oropharyngeal wall without mass/lesion  Neck: Supple, symmetric, trachea midline, no palpable mass/lesion, no palpable cervical lymphadenopathy  Skin: Warm and dry, no concerning lesions  Respiratory: Respirations even, unlabored  Assessment:       1. Dysfunction of both eustachian tubes    2. Left chronic serous otitis media    3. Tinnitus, unspecified laterality        Plan:       Cipro   Discussed placing tube   F/u 2 weeks

## 2024-07-08 ENCOUNTER — OFFICE VISIT (OUTPATIENT)
Dept: OTOLARYNGOLOGY | Facility: CLINIC | Age: 64
End: 2024-07-08
Payer: COMMERCIAL

## 2024-07-08 VITALS — BODY MASS INDEX: 26.77 KG/M2 | HEIGHT: 73 IN | WEIGHT: 202 LBS

## 2024-07-08 DIAGNOSIS — H90.2 CONDUCTIVE HEARING LOSS, UNSPECIFIED LATERALITY: ICD-10-CM

## 2024-07-08 DIAGNOSIS — H65.22 LEFT CHRONIC SEROUS OTITIS MEDIA: Primary | ICD-10-CM

## 2024-07-08 DIAGNOSIS — H93.19 TINNITUS, UNSPECIFIED LATERALITY: ICD-10-CM

## 2024-07-08 PROCEDURE — 4010F ACE/ARB THERAPY RXD/TAKEN: CPT | Mod: ,,, | Performed by: OTOLARYNGOLOGY

## 2024-07-08 PROCEDURE — 1159F MED LIST DOCD IN RCRD: CPT | Mod: ,,, | Performed by: OTOLARYNGOLOGY

## 2024-07-08 PROCEDURE — 99214 OFFICE O/P EST MOD 30 MIN: CPT | Mod: S$PBB,,, | Performed by: OTOLARYNGOLOGY

## 2024-07-08 PROCEDURE — 3008F BODY MASS INDEX DOCD: CPT | Mod: ,,, | Performed by: OTOLARYNGOLOGY

## 2024-07-08 PROCEDURE — 99999 PR PBB SHADOW E&M-EST. PATIENT-LVL IV: CPT | Mod: PBBFAC,,, | Performed by: OTOLARYNGOLOGY

## 2024-07-08 PROCEDURE — 1160F RVW MEDS BY RX/DR IN RCRD: CPT | Mod: ,,, | Performed by: OTOLARYNGOLOGY

## 2024-07-08 PROCEDURE — 99214 OFFICE O/P EST MOD 30 MIN: CPT | Mod: PBBFAC | Performed by: OTOLARYNGOLOGY

## 2024-07-08 RX ORDER — AMOXICILLIN AND CLAVULANATE POTASSIUM 500; 125 MG/1; MG/1
1 TABLET, FILM COATED ORAL 2 TIMES DAILY
Qty: 28 TABLET | Refills: 0 | Status: SHIPPED | OUTPATIENT
Start: 2024-07-08

## 2024-07-08 NOTE — PROGRESS NOTES
Subjective:       Patient ID: Earnest Gonzalez is a 64 y.o. male.    Chief Complaint: Tinnitus (Left ear. Pt states he has a constant ringing and pressure in his left ear.)    Ringing in Ears:    Associated symptoms: Tinnitus.      Review of Systems   HENT:  Positive for hearing loss and tinnitus.    All other systems reviewed and are negative.      Objective:      Physical Exam  General: NAD  Head: Normocephalic, atraumatic, no facial asymmetry/normal strength,  Ears: Both auricules normal in appearance, w/o deformities tympanic membranes fluid external auditory canals normal  Nose: External nose w/o deformities normal turbinates no drainage or inflammation  Oral Cavity: Lips, gums, floor of mouth, tongue hard palate, and buccal mucosa without mass/lesion  Oropharynx: Mucosa pink and moist, soft palate, posterior pharynx and oropharyngeal wall without mass/lesion  Neck: Supple, symmetric, trachea midline, no palpable mass/lesion, no palpable cervical lymphadenopathy  Skin: Warm and dry, no concerning lesions  Respiratory: Respirations even, unlabored  Assessment:       1. Left chronic serous otitis media    2. Tinnitus, unspecified laterality    3. Conductive hearing loss, unspecified laterality        Plan:       Augmentin   ? Clearing up slowly     Still may need tube

## 2025-01-30 ENCOUNTER — OFFICE VISIT (OUTPATIENT)
Dept: PRIMARY CARE CLINIC | Facility: CLINIC | Age: 65
End: 2025-01-30
Payer: COMMERCIAL

## 2025-01-30 VITALS
BODY MASS INDEX: 27.43 KG/M2 | WEIGHT: 207 LBS | SYSTOLIC BLOOD PRESSURE: 115 MMHG | HEIGHT: 73 IN | OXYGEN SATURATION: 98 % | HEART RATE: 72 BPM | DIASTOLIC BLOOD PRESSURE: 70 MMHG | RESPIRATION RATE: 20 BRPM | TEMPERATURE: 97 F

## 2025-01-30 DIAGNOSIS — I10 ESSENTIAL HYPERTENSION: ICD-10-CM

## 2025-01-30 DIAGNOSIS — E78.5 HYPERLIPIDEMIA, UNSPECIFIED HYPERLIPIDEMIA TYPE: Primary | ICD-10-CM

## 2025-01-30 DIAGNOSIS — M19.90 ARTHRITIS: ICD-10-CM

## 2025-01-30 DIAGNOSIS — H93.12 TINNITUS OF LEFT EAR: ICD-10-CM

## 2025-01-30 DIAGNOSIS — I25.10 CORONARY ARTERY DISEASE INVOLVING NATIVE CORONARY ARTERY OF NATIVE HEART, UNSPECIFIED WHETHER ANGINA PRESENT: ICD-10-CM

## 2025-01-30 DIAGNOSIS — Z12.5 ENCOUNTER FOR SCREENING FOR MALIGNANT NEOPLASM OF PROSTATE: ICD-10-CM

## 2025-01-30 LAB
ALBUMIN SERPL BCP-MCNC: 3.8 G/DL (ref 3.4–4.8)
ALBUMIN/GLOB SERPL: 1.2 {RATIO}
ALP SERPL-CCNC: 47 U/L (ref 40–150)
ALT SERPL W P-5'-P-CCNC: 21 U/L
ANION GAP SERPL CALCULATED.3IONS-SCNC: 12 MMOL/L (ref 7–16)
AST SERPL W P-5'-P-CCNC: 23 U/L (ref 5–34)
BASOPHILS # BLD AUTO: 0.04 K/UL (ref 0–0.2)
BASOPHILS NFR BLD AUTO: 0.5 % (ref 0–1)
BILIRUB SERPL-MCNC: 0.3 MG/DL
BUN SERPL-MCNC: 18 MG/DL (ref 8–26)
BUN/CREAT SERPL: 20 (ref 6–20)
CALCIUM SERPL-MCNC: 9.5 MG/DL (ref 8.8–10)
CHLORIDE SERPL-SCNC: 106 MMOL/L (ref 98–107)
CHOLEST SERPL-MCNC: 203 MG/DL
CHOLEST/HDLC SERPL: 4.3 {RATIO}
CO2 SERPL-SCNC: 23 MMOL/L (ref 23–31)
CREAT SERPL-MCNC: 0.9 MG/DL (ref 0.72–1.25)
DIFFERENTIAL METHOD BLD: ABNORMAL
EGFR (NO RACE VARIABLE) (RUSH/TITUS): 96 ML/MIN/1.73M2
EOSINOPHIL # BLD AUTO: 0.41 K/UL (ref 0–0.5)
EOSINOPHIL NFR BLD AUTO: 5.3 % (ref 1–4)
ERYTHROCYTE [DISTWIDTH] IN BLOOD BY AUTOMATED COUNT: 14.8 % (ref 11.5–14.5)
GLOBULIN SER-MCNC: 3.2 G/DL (ref 2–4)
GLUCOSE SERPL-MCNC: 114 MG/DL (ref 82–115)
HCT VFR BLD AUTO: 41.6 % (ref 40–54)
HDLC SERPL-MCNC: 47 MG/DL (ref 35–60)
HGB BLD-MCNC: 12.5 G/DL (ref 13.5–18)
IMM GRANULOCYTES # BLD AUTO: 0.02 K/UL (ref 0–0.04)
IMM GRANULOCYTES NFR BLD: 0.3 % (ref 0–0.4)
LDLC SERPL CALC-MCNC: 121 MG/DL
LDLC/HDLC SERPL: 2.6 {RATIO}
LYMPHOCYTES # BLD AUTO: 1.79 K/UL (ref 1–4.8)
LYMPHOCYTES NFR BLD AUTO: 23.2 % (ref 27–41)
MCH RBC QN AUTO: 26.9 PG (ref 27–31)
MCHC RBC AUTO-ENTMCNC: 30 G/DL (ref 32–36)
MCV RBC AUTO: 89.5 FL (ref 80–96)
MONOCYTES # BLD AUTO: 0.64 K/UL (ref 0–0.8)
MONOCYTES NFR BLD AUTO: 8.3 % (ref 2–6)
MPC BLD CALC-MCNC: 9.7 FL (ref 9.4–12.4)
NEUTROPHILS # BLD AUTO: 4.82 K/UL (ref 1.8–7.7)
NEUTROPHILS NFR BLD AUTO: 62.4 % (ref 53–65)
NONHDLC SERPL-MCNC: 156 MG/DL
NRBC # BLD AUTO: 0 X10E3/UL
NRBC, AUTO (.00): 0 %
PLATELET # BLD AUTO: 365 K/UL (ref 150–400)
POTASSIUM SERPL-SCNC: 4.1 MMOL/L (ref 3.5–5.1)
PROT SERPL-MCNC: 7 G/DL (ref 5.8–7.6)
PSA SERPL-MCNC: 0.72 NG/ML
RBC # BLD AUTO: 4.65 M/UL (ref 4.6–6.2)
SODIUM SERPL-SCNC: 137 MMOL/L (ref 136–145)
TRIGL SERPL-MCNC: 173 MG/DL (ref 34–140)
VLDLC SERPL-MCNC: 35 MG/DL
WBC # BLD AUTO: 7.72 K/UL (ref 4.5–11)

## 2025-01-30 PROCEDURE — 85025 COMPLETE CBC W/AUTO DIFF WBC: CPT | Mod: ,,, | Performed by: CLINICAL MEDICAL LABORATORY

## 2025-01-30 PROCEDURE — 99214 OFFICE O/P EST MOD 30 MIN: CPT | Mod: 25,,, | Performed by: FAMILY MEDICINE

## 2025-01-30 PROCEDURE — 4010F ACE/ARB THERAPY RXD/TAKEN: CPT | Mod: CPTII,,, | Performed by: FAMILY MEDICINE

## 2025-01-30 PROCEDURE — 3008F BODY MASS INDEX DOCD: CPT | Mod: CPTII,,, | Performed by: FAMILY MEDICINE

## 2025-01-30 PROCEDURE — G0103 PSA SCREENING: HCPCS | Mod: ,,, | Performed by: CLINICAL MEDICAL LABORATORY

## 2025-01-30 PROCEDURE — 80061 LIPID PANEL: CPT | Mod: ,,, | Performed by: CLINICAL MEDICAL LABORATORY

## 2025-01-30 PROCEDURE — 96372 THER/PROPH/DIAG INJ SC/IM: CPT | Mod: ,,, | Performed by: FAMILY MEDICINE

## 2025-01-30 PROCEDURE — 3078F DIAST BP <80 MM HG: CPT | Mod: CPTII,,, | Performed by: FAMILY MEDICINE

## 2025-01-30 PROCEDURE — 3074F SYST BP LT 130 MM HG: CPT | Mod: CPTII,,, | Performed by: FAMILY MEDICINE

## 2025-01-30 PROCEDURE — 1159F MED LIST DOCD IN RCRD: CPT | Mod: CPTII,,, | Performed by: FAMILY MEDICINE

## 2025-01-30 PROCEDURE — 1160F RVW MEDS BY RX/DR IN RCRD: CPT | Mod: CPTII,,, | Performed by: FAMILY MEDICINE

## 2025-01-30 PROCEDURE — 80053 COMPREHEN METABOLIC PANEL: CPT | Mod: ,,, | Performed by: CLINICAL MEDICAL LABORATORY

## 2025-01-30 RX ORDER — MULTIVIT WITH MINERALS/HERBS
1 TABLET ORAL DAILY
COMMUNITY

## 2025-01-30 RX ORDER — MULTIVIT,STRESS FORMULA/ZINC
TABLET ORAL
COMMUNITY

## 2025-01-30 RX ORDER — DEXAMETHASONE SODIUM PHOSPHATE 4 MG/ML
4 INJECTION, SOLUTION INTRA-ARTICULAR; INTRALESIONAL; INTRAMUSCULAR; INTRAVENOUS; SOFT TISSUE
Status: COMPLETED | OUTPATIENT
Start: 2025-01-30 | End: 2025-01-30

## 2025-01-30 RX ORDER — ASPIRIN 81 MG/1
81 TABLET ORAL DAILY
COMMUNITY

## 2025-01-30 RX ORDER — METHYLPREDNISOLONE ACETATE 80 MG/ML
80 INJECTION, SUSPENSION INTRA-ARTICULAR; INTRALESIONAL; INTRAMUSCULAR; SOFT TISSUE
Status: COMPLETED | OUTPATIENT
Start: 2025-01-30 | End: 2025-01-30

## 2025-01-30 RX ORDER — KETOROLAC TROMETHAMINE 30 MG/ML
60 INJECTION, SOLUTION INTRAMUSCULAR; INTRAVENOUS
Status: COMPLETED | OUTPATIENT
Start: 2025-01-30 | End: 2025-01-30

## 2025-01-30 RX ADMIN — KETOROLAC TROMETHAMINE 60 MG: 30 INJECTION, SOLUTION INTRAMUSCULAR; INTRAVENOUS at 11:01

## 2025-01-30 RX ADMIN — DEXAMETHASONE SODIUM PHOSPHATE 4 MG: 4 INJECTION, SOLUTION INTRA-ARTICULAR; INTRALESIONAL; INTRAMUSCULAR; INTRAVENOUS; SOFT TISSUE at 11:01

## 2025-01-30 RX ADMIN — METHYLPREDNISOLONE ACETATE 80 MG: 80 INJECTION, SUSPENSION INTRA-ARTICULAR; INTRALESIONAL; INTRAMUSCULAR; SOFT TISSUE at 11:01

## 2025-01-30 NOTE — PROGRESS NOTES
Subjective     Patient ID: Earnest Gonzalez is a 65 y.o. male.    Chief Complaint: Sinus Problem (C/O sinuses ), Cough, Tinnitus, Flank Pain, and Injections (Arthritis )    Having tinnitis left ear. Discussion. Wants a second opinion concerning this.    Sinus Problem  Associated symptoms include coughing. Pertinent negatives include no headaches or shortness of breath.   Cough  Pertinent negatives include no chest pain, fever, headaches, myalgias or shortness of breath. There is no history of environmental allergies.   Ringing in Ears:    Associated symptoms: Tinnitus.  No fever and no headaches.No environmental allergies.  Flank Pain  Pertinent negatives include no chest pain, fever or headaches.     Review of Systems   Constitutional:  Negative for fatigue and fever.   HENT:  Positive for tinnitus. Negative for dental problem.    Eyes:  Negative for discharge.   Respiratory:  Positive for cough. Negative for choking, chest tightness and shortness of breath.    Cardiovascular:  Negative for chest pain and leg swelling.   Gastrointestinal:  Negative for constipation, diarrhea, nausea and vomiting.   Genitourinary:  Positive for flank pain. Negative for discharge.   Musculoskeletal:  Positive for arthralgias. Negative for myalgias.   Allergic/Immunologic: Negative for environmental allergies.   Neurological:  Negative for headaches and memory loss.   Psychiatric/Behavioral:  Negative for behavioral problems and hallucinations.           Objective     Physical Exam  Vitals and nursing note reviewed.   Constitutional:       Appearance: Normal appearance. He is normal weight.   HENT:      Head: Normocephalic and atraumatic.      Right Ear: Tympanic membrane normal.      Left Ear: Tympanic membrane normal.      Ears:      Comments: Both TM's are gray but dull     Nose: Nose normal.      Mouth/Throat:      Mouth: Mucous membranes are moist.   Eyes:      Extraocular Movements: Extraocular movements intact.       Conjunctiva/sclera: Conjunctivae normal.      Pupils: Pupils are equal, round, and reactive to light.   Cardiovascular:      Rate and Rhythm: Normal rate and regular rhythm.      Pulses: Normal pulses.   Pulmonary:      Effort: Pulmonary effort is normal.      Breath sounds: Normal breath sounds.   Abdominal:      General: Abdomen is flat. Bowel sounds are normal.      Palpations: Abdomen is soft.   Musculoskeletal:         General: Normal range of motion.      Cervical back: Normal range of motion and neck supple.      Comments: Multiple site arthritis   Skin:     General: Skin is warm and dry.   Neurological:      General: No focal deficit present.      Mental Status: He is alert and oriented to person, place, and time.   Psychiatric:         Mood and Affect: Mood normal.            Assessment and Plan     1. Hyperlipidemia, unspecified hyperlipidemia type  -     Lipid Panel; Future; Expected date: 01/30/2025    2. Essential hypertension  -     CBC Auto Differential; Future; Expected date: 01/30/2025  -     Comprehensive Metabolic Panel; Future; Expected date: 01/30/2025    3. Encounter for screening for malignant neoplasm of prostate  -     PSA, Screening; Future; Expected date: 01/30/2025    4. Arthritis  -     dexAMETHasone injection 4 mg  -     methylPREDNISolone acetate injection 80 mg  -     ketorolac injection 60 mg    5. Coronary artery disease involving native coronary artery of native heart, unspecified whether angina present    6. Tinnitus of left ear        Referral to ENT in [place]  RTC if s/sx continue         No follow-ups on file.

## 2025-02-06 ENCOUNTER — TELEPHONE (OUTPATIENT)
Dept: PRIMARY CARE CLINIC | Facility: CLINIC | Age: 65
End: 2025-02-06
Payer: COMMERCIAL

## 2025-02-24 ENCOUNTER — OFFICE VISIT (OUTPATIENT)
Dept: OTOLARYNGOLOGY | Facility: CLINIC | Age: 65
End: 2025-02-24
Payer: COMMERCIAL

## 2025-02-24 ENCOUNTER — CLINICAL SUPPORT (OUTPATIENT)
Dept: AUDIOLOGY | Facility: CLINIC | Age: 65
End: 2025-02-24
Payer: COMMERCIAL

## 2025-02-24 VITALS — WEIGHT: 207 LBS | BODY MASS INDEX: 27.43 KG/M2 | HEIGHT: 73 IN

## 2025-02-24 DIAGNOSIS — H90.3 SENSORINEURAL HEARING LOSS, BILATERAL: Primary | ICD-10-CM

## 2025-02-24 DIAGNOSIS — H93.12 TINNITUS OF LEFT EAR: ICD-10-CM

## 2025-02-24 DIAGNOSIS — H90.3 SENSORY HEARING LOSS, BILATERAL: Primary | ICD-10-CM

## 2025-02-24 DIAGNOSIS — H93.19 TINNITUS, UNSPECIFIED LATERALITY: Primary | ICD-10-CM

## 2025-02-24 DIAGNOSIS — R09.81 CHRONIC NASAL CONGESTION: ICD-10-CM

## 2025-02-24 DIAGNOSIS — J32.8 OTHER CHRONIC SINUSITIS: ICD-10-CM

## 2025-02-24 PROCEDURE — 99999 PR PBB SHADOW E&M-EST. PATIENT-LVL II: CPT | Mod: PBBFAC,,,

## 2025-02-24 PROCEDURE — 99999 PR PBB SHADOW E&M-EST. PATIENT-LVL II: CPT | Mod: PBBFAC,,, | Performed by: AUDIOLOGIST

## 2025-02-24 PROCEDURE — 99212 OFFICE O/P EST SF 10 MIN: CPT | Mod: PBBFAC,25

## 2025-02-24 PROCEDURE — 4010F ACE/ARB THERAPY RXD/TAKEN: CPT | Mod: ,,, | Performed by: OTOLARYNGOLOGY

## 2025-02-24 PROCEDURE — 3008F BODY MASS INDEX DOCD: CPT | Mod: ,,, | Performed by: OTOLARYNGOLOGY

## 2025-02-24 PROCEDURE — 99499 UNLISTED E&M SERVICE: CPT | Mod: S$PBB,,, | Performed by: OTOLARYNGOLOGY

## 2025-02-24 PROCEDURE — 1159F MED LIST DOCD IN RCRD: CPT | Mod: ,,, | Performed by: OTOLARYNGOLOGY

## 2025-02-24 PROCEDURE — 99999 PR PBB SHADOW E&M-EST. PATIENT-LVL III: CPT | Mod: PBBFAC,,, | Performed by: OTOLARYNGOLOGY

## 2025-02-24 PROCEDURE — 1160F RVW MEDS BY RX/DR IN RCRD: CPT | Mod: ,,, | Performed by: OTOLARYNGOLOGY

## 2025-02-24 PROCEDURE — 99214 OFFICE O/P EST MOD 30 MIN: CPT | Mod: S$PBB,,, | Performed by: OTOLARYNGOLOGY

## 2025-02-24 PROCEDURE — 99212 OFFICE O/P EST SF 10 MIN: CPT | Mod: PBBFAC,25 | Performed by: AUDIOLOGIST

## 2025-02-24 PROCEDURE — 92557 COMPREHENSIVE HEARING TEST: CPT | Mod: PBBFAC | Performed by: AUDIOLOGIST

## 2025-02-24 PROCEDURE — 99213 OFFICE O/P EST LOW 20 MIN: CPT | Mod: PBBFAC,25 | Performed by: OTOLARYNGOLOGY

## 2025-02-24 RX ORDER — CLINDAMYCIN HYDROCHLORIDE 300 MG/1
300 CAPSULE ORAL 2 TIMES DAILY
Qty: 28 CAPSULE | Refills: 0 | Status: SHIPPED | OUTPATIENT
Start: 2025-02-24

## 2025-02-24 NOTE — PROGRESS NOTES
Unoccluded EACs; WNL sloping to mild rising to WNL sloping to moderately-severe rising to moderate SNHL AD; WNL sloping to moderately-severe rising to moderate SNHL AS; PUSH BUTTON RESPONSE; RECOMMEND ANNUAL AUDIO. EVAL. (SOONER IF NEEDED)

## 2025-02-24 NOTE — PROGRESS NOTES
Subjective:       Patient ID: Earnest Gonzalez is a 65 y.o. male.    Chief Complaint: Tinnitus (Left ear. Hearing test done. )  Sinus congestion  Ringing in Ears:    Associated symptoms: Tinnitus.      Review of Systems   HENT:  Positive for congestion, hearing loss and tinnitus.    All other systems reviewed and are negative.      Objective:      Physical Exam  General: NAD  Head: Normocephalic, atraumatic, no facial asymmetry/normal strength,  Ears: Both auricules normal in appearance, w/o deformities tympanic membranes normal external auditory canals some wax right   Nose: External nose w/o deformities congested turbinates no drainage or inflammation  Oral Cavity: Lips, gums, floor of mouth, tongue hard palate, and buccal mucosa without mass/lesion  Oropharynx: Mucosa pink and moist, soft palate, posterior pharynx and oropharyngeal wall without mass/lesion  Neck: Supple, symmetric, trachea midline, no palpable mass/lesion, no palpable cervical lymphadenopathy  Skin: Warm and dry, no concerning lesions  Respiratory: Respirations even, unlabored  Assessment:       1. Tinnitus, unspecified laterality    2. Chronic nasal congestion    3. Other chronic sinusitis        Plan:       Cleocin for sinuses   Audio reviewed and discussed tinnitus etc in detail

## 2025-03-18 DIAGNOSIS — F32.A DEPRESSION, UNSPECIFIED DEPRESSION TYPE: Primary | ICD-10-CM

## 2025-03-18 RX ORDER — SERTRALINE HYDROCHLORIDE 100 MG/1
100 TABLET, FILM COATED ORAL DAILY
Qty: 90 TABLET | Refills: 3 | Status: SHIPPED | OUTPATIENT
Start: 2025-03-18

## 2025-04-21 ENCOUNTER — OFFICE VISIT (OUTPATIENT)
Dept: PRIMARY CARE CLINIC | Facility: CLINIC | Age: 65
End: 2025-04-21
Payer: COMMERCIAL

## 2025-04-21 VITALS
HEIGHT: 73 IN | TEMPERATURE: 98 F | SYSTOLIC BLOOD PRESSURE: 130 MMHG | DIASTOLIC BLOOD PRESSURE: 78 MMHG | WEIGHT: 203 LBS | HEART RATE: 78 BPM | OXYGEN SATURATION: 96 % | BODY MASS INDEX: 26.9 KG/M2 | RESPIRATION RATE: 20 BRPM

## 2025-04-21 DIAGNOSIS — Z91.038 ALLERGIC REACTION TO INSECT BITE: ICD-10-CM

## 2025-04-21 DIAGNOSIS — M19.90 ARTHRITIS: Primary | ICD-10-CM

## 2025-04-21 DIAGNOSIS — I25.10 CORONARY ARTERY DISEASE INVOLVING NATIVE CORONARY ARTERY OF NATIVE HEART, UNSPECIFIED WHETHER ANGINA PRESENT: ICD-10-CM

## 2025-04-21 PROBLEM — K58.9 IRRITABLE BOWEL SYNDROME: Status: ACTIVE | Noted: 2024-11-20

## 2025-04-21 PROBLEM — R14.0 ABDOMINAL BLOATING: Status: ACTIVE | Noted: 2024-11-20

## 2025-04-21 PROBLEM — K22.10 EROSIVE ESOPHAGITIS: Status: ACTIVE | Noted: 2024-11-20

## 2025-04-21 PROBLEM — Z86.0101 PERSONAL HISTORY OF ADENOMATOUS AND SERRATED COLON POLYPS: Status: ACTIVE | Noted: 2024-11-20

## 2025-04-21 PROBLEM — Z80.0 FAMILY HISTORY OF COLON CANCER: Status: ACTIVE | Noted: 2024-11-20

## 2025-04-21 PROCEDURE — 1160F RVW MEDS BY RX/DR IN RCRD: CPT | Mod: CPTII,,, | Performed by: FAMILY MEDICINE

## 2025-04-21 PROCEDURE — 3008F BODY MASS INDEX DOCD: CPT | Mod: CPTII,,, | Performed by: FAMILY MEDICINE

## 2025-04-21 PROCEDURE — 96372 THER/PROPH/DIAG INJ SC/IM: CPT | Mod: ,,, | Performed by: FAMILY MEDICINE

## 2025-04-21 PROCEDURE — 4010F ACE/ARB THERAPY RXD/TAKEN: CPT | Mod: CPTII,,, | Performed by: FAMILY MEDICINE

## 2025-04-21 PROCEDURE — 1159F MED LIST DOCD IN RCRD: CPT | Mod: CPTII,,, | Performed by: FAMILY MEDICINE

## 2025-04-21 PROCEDURE — 99213 OFFICE O/P EST LOW 20 MIN: CPT | Mod: 25,,, | Performed by: FAMILY MEDICINE

## 2025-04-21 PROCEDURE — 3075F SYST BP GE 130 - 139MM HG: CPT | Mod: CPTII,,, | Performed by: FAMILY MEDICINE

## 2025-04-21 PROCEDURE — 3078F DIAST BP <80 MM HG: CPT | Mod: CPTII,,, | Performed by: FAMILY MEDICINE

## 2025-04-21 RX ORDER — TADALAFIL 20 MG/1
20 TABLET ORAL DAILY PRN
COMMUNITY
Start: 2025-04-17

## 2025-04-21 RX ORDER — METHYLPREDNISOLONE 4 MG/1
TABLET ORAL
Qty: 21 EACH | Refills: 0 | Status: SHIPPED | OUTPATIENT
Start: 2025-04-21 | End: 2025-05-12

## 2025-04-21 RX ORDER — KETOROLAC TROMETHAMINE 30 MG/ML
60 INJECTION, SOLUTION INTRAMUSCULAR; INTRAVENOUS
Status: COMPLETED | OUTPATIENT
Start: 2025-04-21 | End: 2025-04-21

## 2025-04-21 RX ORDER — BETAMETHASONE SODIUM PHOSPHATE AND BETAMETHASONE ACETATE 3; 3 MG/ML; MG/ML
6 INJECTION, SUSPENSION INTRA-ARTICULAR; INTRALESIONAL; INTRAMUSCULAR; SOFT TISSUE ONCE
Status: COMPLETED | OUTPATIENT
Start: 2025-04-21 | End: 2025-04-21

## 2025-04-21 RX ADMIN — KETOROLAC TROMETHAMINE 60 MG: 30 INJECTION, SOLUTION INTRAMUSCULAR; INTRAVENOUS at 11:04

## 2025-04-21 RX ADMIN — BETAMETHASONE SODIUM PHOSPHATE AND BETAMETHASONE ACETATE 6 MG: 3; 3 INJECTION, SUSPENSION INTRA-ARTICULAR; INTRALESIONAL; INTRAMUSCULAR; SOFT TISSUE at 11:04

## 2025-04-21 NOTE — PROGRESS NOTES
Subjective     Patient ID: Earnest Gonzalez is a 65 y.o. male.    Chief Complaint: Insect Bite (On the back of neck on Saturday ), Facial Swelling (C/O face is swollen under eyes ), and Arthritis    Pt with facial edema after an insect bite. Also with arthritic issues. Had a good cardiology evaluation last week    Insect Bite  Associated symptoms include arthralgias. Pertinent negatives include no chest pain, coughing, fatigue, fever, headaches, myalgias, nausea or vomiting.   Arthritis  Pertinent negatives include no diarrhea, fatigue or fever.     Review of Systems   Constitutional:  Negative for fatigue and fever.   HENT:  Negative for dental problem.    Eyes:  Negative for discharge.   Respiratory:  Negative for cough, choking, chest tightness and shortness of breath.    Cardiovascular:  Negative for chest pain and leg swelling.   Gastrointestinal:  Negative for constipation, diarrhea, nausea and vomiting.   Genitourinary:  Negative for discharge and flank pain.   Musculoskeletal:  Positive for arthralgias and arthritis. Negative for myalgias.   Allergic/Immunologic: Negative for environmental allergies.   Neurological:  Negative for headaches and memory loss.   Psychiatric/Behavioral:  Negative for behavioral problems and hallucinations.           Objective     Physical Exam  Vitals and nursing note reviewed.   Constitutional:       Appearance: Normal appearance. He is normal weight.   HENT:      Head: Normocephalic and atraumatic.      Comments: Pt. With generalized edema of face     Right Ear: Tympanic membrane normal.      Left Ear: Tympanic membrane normal.      Nose: Nose normal.      Mouth/Throat:      Mouth: Mucous membranes are moist.   Eyes:      Extraocular Movements: Extraocular movements intact.      Conjunctiva/sclera: Conjunctivae normal.      Pupils: Pupils are equal, round, and reactive to light.   Cardiovascular:      Rate and Rhythm: Normal rate and regular rhythm.      Pulses: Normal pulses.    Pulmonary:      Effort: Pulmonary effort is normal.      Breath sounds: Normal breath sounds.   Abdominal:      General: Abdomen is flat. Bowel sounds are normal.      Palpations: Abdomen is soft.   Musculoskeletal:         General: Normal range of motion.      Cervical back: Normal range of motion and neck supple.      Comments: Multiple site arthritis   Skin:     General: Skin is warm and dry.   Neurological:      General: No focal deficit present.      Mental Status: He is alert and oriented to person, place, and time.   Psychiatric:         Mood and Affect: Mood normal.            Assessment and Plan     1. Arthritis  -     ketorolac injection 60 mg    2. Coronary artery disease involving native coronary artery of native heart, unspecified whether angina present    3. Allergic reaction to insect bite  -     betamethasone acetate-betamethasone sodium phosphate injection 6 mg  -     methylPREDNISolone (MEDROL DOSEPACK) 4 mg tablet; use as directed  Dispense: 21 each; Refill: 0        RTC if s/sx continue         No follow-ups on file.

## (undated) DEVICE — GLOVE BIOGEL SKINSENSE PI 7.0

## (undated) DEVICE — GLOVE 7.5 PROTEXIS PI BLUE

## (undated) DEVICE — DRAPE INVISISHIELD U 48X52IN

## (undated) DEVICE — SUT ETHILON 3-0 PS2 18 BLK

## (undated) DEVICE — GLOVE 7.5 PROTEXIS PI MICRO

## (undated) DEVICE — APPLICATOR CHLORAPREP ORN 26ML

## (undated) DEVICE — GLOVE 8 PROTEXIS PI BLUE

## (undated) DEVICE — TUBE SUCTION MEDI-VAC STERILE

## (undated) DEVICE — DRAPE ARTHSCP T ORTHOMAX POUCH

## (undated) DEVICE — SOLIDIFIER BTL W/TREAT 1500CC

## (undated) DEVICE — CANISTER SUCTION MEDI-VAC 12L

## (undated) DEVICE — BANDAGE ACE DOUBLE STER 6IN

## (undated) DEVICE — SOL NACL IRR 3000ML

## (undated) DEVICE — GLOVE 6.5 PROTEXIS PI BLUE

## (undated) DEVICE — GLOVE 6.5 PROTEXIS PI MICRO

## (undated) DEVICE — PACK KNEE ARTHROSCOPY  RUSH

## (undated) DEVICE — SYR 10CC LUER LOCK

## (undated) DEVICE — DEVICE SUCTION H20 BROOM 12FT

## (undated) DEVICE — DRESSING XEROFORM NONADH 1X8IN

## (undated) DEVICE — TOURNIQUET SB QC SP 34X4IN

## (undated) DEVICE — SPONGE COTTON TRAY 4X4IN

## (undated) DEVICE — NDL SPINAL 18GX3.5 SPINOCAN

## (undated) DEVICE — NDL HYPODERMIC SAFETY 25G 1IN

## (undated) DEVICE — TUBING CROSSFLOW INFLOW CASS

## (undated) DEVICE — WAND COBLATION WEREWOLF FLOW 50

## (undated) DEVICE — GLOVE 7.0 PROTEXIS PI BLUE

## (undated) DEVICE — COVER PROXIMA MAYO STAND

## (undated) DEVICE — Device

## (undated) DEVICE — PAD ABDOMINAL 8X7.5 STERILE